# Patient Record
Sex: FEMALE | Race: WHITE | Employment: OTHER | URBAN - METROPOLITAN AREA
[De-identification: names, ages, dates, MRNs, and addresses within clinical notes are randomized per-mention and may not be internally consistent; named-entity substitution may affect disease eponyms.]

---

## 2017-04-05 ENCOUNTER — APPOINTMENT (OUTPATIENT)
Dept: CT IMAGING | Facility: CLINIC | Age: 67
End: 2017-04-05
Attending: EMERGENCY MEDICINE
Payer: MEDICARE

## 2017-04-05 ENCOUNTER — HOSPITAL ENCOUNTER (OUTPATIENT)
Facility: CLINIC | Age: 67
Setting detail: OBSERVATION
Discharge: NURSING, REHAB OR LONG-TERM CARE | End: 2017-04-07
Attending: EMERGENCY MEDICINE | Admitting: EMERGENCY MEDICINE
Payer: MEDICARE

## 2017-04-05 ENCOUNTER — APPOINTMENT (OUTPATIENT)
Dept: GENERAL RADIOLOGY | Facility: CLINIC | Age: 67
End: 2017-04-05
Attending: EMERGENCY MEDICINE
Payer: MEDICARE

## 2017-04-05 DIAGNOSIS — R29.6 FALLS FREQUENTLY: ICD-10-CM

## 2017-04-05 DIAGNOSIS — N39.0 URINARY TRACT INFECTION WITHOUT HEMATURIA, SITE UNSPECIFIED: Primary | ICD-10-CM

## 2017-04-05 DIAGNOSIS — G20.A1 PARKINSON DISEASE (H): ICD-10-CM

## 2017-04-05 DIAGNOSIS — E86.0 DEHYDRATION: ICD-10-CM

## 2017-04-05 LAB
ALBUMIN SERPL-MCNC: 4 G/DL (ref 3.4–5)
ALP SERPL-CCNC: 126 U/L (ref 40–150)
ALT SERPL W P-5'-P-CCNC: 8 U/L (ref 0–50)
ANION GAP SERPL CALCULATED.3IONS-SCNC: 6 MMOL/L (ref 3–14)
AST SERPL W P-5'-P-CCNC: 22 U/L (ref 0–45)
BASOPHILS # BLD AUTO: 0 10E9/L (ref 0–0.2)
BASOPHILS NFR BLD AUTO: 0.1 %
BILIRUB SERPL-MCNC: 0.6 MG/DL (ref 0.2–1.3)
BUN SERPL-MCNC: 33 MG/DL (ref 7–30)
CALCIUM SERPL-MCNC: 9.7 MG/DL (ref 8.5–10.1)
CHLORIDE SERPL-SCNC: 110 MMOL/L (ref 94–109)
CO2 SERPL-SCNC: 29 MMOL/L (ref 20–32)
CREAT SERPL-MCNC: 0.78 MG/DL (ref 0.52–1.04)
DIFFERENTIAL METHOD BLD: NORMAL
EOSINOPHIL # BLD AUTO: 0.2 10E9/L (ref 0–0.7)
EOSINOPHIL NFR BLD AUTO: 3.3 %
ERYTHROCYTE [DISTWIDTH] IN BLOOD BY AUTOMATED COUNT: 12.9 % (ref 10–15)
GFR SERPL CREATININE-BSD FRML MDRD: 74 ML/MIN/1.7M2
GLUCOSE SERPL-MCNC: 88 MG/DL (ref 70–99)
HCT VFR BLD AUTO: 43.1 % (ref 35–47)
HGB BLD-MCNC: 14.3 G/DL (ref 11.7–15.7)
IMM GRANULOCYTES # BLD: 0 10E9/L (ref 0–0.4)
IMM GRANULOCYTES NFR BLD: 0.1 %
LYMPHOCYTES # BLD AUTO: 1.6 10E9/L (ref 0.8–5.3)
LYMPHOCYTES NFR BLD AUTO: 22.3 %
MAGNESIUM SERPL-MCNC: 2.4 MG/DL (ref 1.6–2.3)
MCH RBC QN AUTO: 30.6 PG (ref 26.5–33)
MCHC RBC AUTO-ENTMCNC: 33.2 G/DL (ref 31.5–36.5)
MCV RBC AUTO: 92 FL (ref 78–100)
MONOCYTES # BLD AUTO: 0.4 10E9/L (ref 0–1.3)
MONOCYTES NFR BLD AUTO: 5.5 %
NEUTROPHILS # BLD AUTO: 5 10E9/L (ref 1.6–8.3)
NEUTROPHILS NFR BLD AUTO: 68.7 %
NRBC # BLD AUTO: 0 10*3/UL
NRBC BLD AUTO-RTO: 0 /100
PHOSPHATE SERPL-MCNC: 3.2 MG/DL (ref 2.5–4.5)
PLATELET # BLD AUTO: 217 10E9/L (ref 150–450)
POTASSIUM SERPL-SCNC: 4.2 MMOL/L (ref 3.4–5.3)
PROT SERPL-MCNC: 7.3 G/DL (ref 6.8–8.8)
RBC # BLD AUTO: 4.68 10E12/L (ref 3.8–5.2)
SODIUM SERPL-SCNC: 145 MMOL/L (ref 133–144)
TSH SERPL DL<=0.05 MIU/L-ACNC: 3.89 MU/L (ref 0.4–4)
WBC # BLD AUTO: 7.3 10E9/L (ref 4–11)

## 2017-04-05 PROCEDURE — 83735 ASSAY OF MAGNESIUM: CPT | Performed by: STUDENT IN AN ORGANIZED HEALTH CARE EDUCATION/TRAINING PROGRAM

## 2017-04-05 PROCEDURE — 71010 XR CHEST PORT 1 VW: CPT

## 2017-04-05 PROCEDURE — 84100 ASSAY OF PHOSPHORUS: CPT | Performed by: STUDENT IN AN ORGANIZED HEALTH CARE EDUCATION/TRAINING PROGRAM

## 2017-04-05 PROCEDURE — 99285 EMERGENCY DEPT VISIT HI MDM: CPT | Mod: 25

## 2017-04-05 PROCEDURE — A9270 NON-COVERED ITEM OR SERVICE: HCPCS | Mod: GY | Performed by: STUDENT IN AN ORGANIZED HEALTH CARE EDUCATION/TRAINING PROGRAM

## 2017-04-05 PROCEDURE — 99207 ZZC APP CREDIT; MD BILLING SHARED VISIT: CPT | Mod: Z6 | Performed by: EMERGENCY MEDICINE

## 2017-04-05 PROCEDURE — 84443 ASSAY THYROID STIM HORMONE: CPT | Performed by: STUDENT IN AN ORGANIZED HEALTH CARE EDUCATION/TRAINING PROGRAM

## 2017-04-05 PROCEDURE — 96361 HYDRATE IV INFUSION ADD-ON: CPT | Mod: 59

## 2017-04-05 PROCEDURE — 71250 CT THORAX DX C-: CPT

## 2017-04-05 PROCEDURE — 25000128 H RX IP 250 OP 636: Performed by: EMERGENCY MEDICINE

## 2017-04-05 PROCEDURE — 72170 X-RAY EXAM OF PELVIS: CPT

## 2017-04-05 PROCEDURE — 72125 CT NECK SPINE W/O DYE: CPT

## 2017-04-05 PROCEDURE — 93010 ELECTROCARDIOGRAM REPORT: CPT | Mod: Z6 | Performed by: EMERGENCY MEDICINE

## 2017-04-05 PROCEDURE — 80053 COMPREHEN METABOLIC PANEL: CPT | Performed by: STUDENT IN AN ORGANIZED HEALTH CARE EDUCATION/TRAINING PROGRAM

## 2017-04-05 PROCEDURE — 70450 CT HEAD/BRAIN W/O DYE: CPT

## 2017-04-05 PROCEDURE — 85025 COMPLETE CBC W/AUTO DIFF WBC: CPT | Performed by: STUDENT IN AN ORGANIZED HEALTH CARE EDUCATION/TRAINING PROGRAM

## 2017-04-05 PROCEDURE — 96360 HYDRATION IV INFUSION INIT: CPT

## 2017-04-05 PROCEDURE — 93005 ELECTROCARDIOGRAM TRACING: CPT

## 2017-04-05 PROCEDURE — 25000132 ZZH RX MED GY IP 250 OP 250 PS 637: Mod: GY | Performed by: STUDENT IN AN ORGANIZED HEALTH CARE EDUCATION/TRAINING PROGRAM

## 2017-04-05 PROCEDURE — 99219 ZZC INITIAL OBSERVATION CARE,LEVL II: CPT | Mod: Z6 | Performed by: NURSE PRACTITIONER

## 2017-04-05 RX ORDER — CARBIDOPA AND LEVODOPA 25; 100 MG/1; MG/1
2 TABLET ORAL
COMMUNITY

## 2017-04-05 RX ORDER — CARBIDOPA AND LEVODOPA 25; 100 MG/1; MG/1
1 TABLET ORAL
Status: DISCONTINUED | OUTPATIENT
Start: 2017-04-05 | End: 2017-04-05

## 2017-04-05 RX ORDER — ROPINIROLE 2 MG/1
4 TABLET, FILM COATED ORAL 3 TIMES DAILY
Status: DISCONTINUED | OUTPATIENT
Start: 2017-04-05 | End: 2017-04-06 | Stop reason: DRUGHIGH

## 2017-04-05 RX ORDER — SODIUM CHLORIDE 9 MG/ML
1000 INJECTION, SOLUTION INTRAVENOUS CONTINUOUS
Status: DISCONTINUED | OUTPATIENT
Start: 2017-04-05 | End: 2017-04-06 | Stop reason: DRUGHIGH

## 2017-04-05 RX ORDER — CHLORAL HYDRATE 500 MG
1 CAPSULE ORAL DAILY
COMMUNITY

## 2017-04-05 RX ORDER — CARBIDOPA AND LEVODOPA 25; 100 MG/1; MG/1
2 TABLET ORAL
Status: DISCONTINUED | OUTPATIENT
Start: 2017-04-05 | End: 2017-04-07 | Stop reason: HOSPADM

## 2017-04-05 RX ORDER — IBUPROFEN 200 MG
400 TABLET ORAL 2 TIMES DAILY PRN
Status: DISCONTINUED | OUTPATIENT
Start: 2017-04-05 | End: 2017-04-06

## 2017-04-05 RX ORDER — ROPINIROLE 12 MG/1
1 TABLET, FILM COATED, EXTENDED RELEASE ORAL EVERY EVENING
COMMUNITY

## 2017-04-05 RX ORDER — LORAZEPAM 2 MG/ML
0.5 INJECTION INTRAMUSCULAR
Status: DISCONTINUED | OUTPATIENT
Start: 2017-04-05 | End: 2017-04-06

## 2017-04-05 RX ADMIN — SODIUM CHLORIDE 1000 ML: 9 INJECTION, SOLUTION INTRAVENOUS at 18:19

## 2017-04-05 RX ADMIN — SODIUM CHLORIDE 1000 ML: 9 INJECTION, SOLUTION INTRAVENOUS at 17:18

## 2017-04-05 RX ADMIN — CARBIDOPA AND LEVODOPA 2 TABLET: 25; 100 TABLET ORAL at 16:22

## 2017-04-05 RX ADMIN — ROPINIROLE HYDROCHLORIDE 4 MG: 2 TABLET, FILM COATED ORAL at 20:24

## 2017-04-05 RX ADMIN — IBUPROFEN 400 MG: 200 TABLET, FILM COATED ORAL at 20:30

## 2017-04-05 RX ADMIN — CARBIDOPA AND LEVODOPA 2 TABLET: 25; 100 TABLET ORAL at 21:05

## 2017-04-05 ASSESSMENT — ACTIVITIES OF DAILY LIVING (ADL)
TOILETING: 1-->ASSISTIVE EQUIPMENT
WHICH_OF_THE_ABOVE_FUNCTIONAL_RISKS_HAD_A_RECENT_ONSET_OR_CHANGE?: FALL HISTORY
DRESS: 2-->ASSISTIVE PERSON
AMBULATION: 1-->ASSISTIVE EQUIPMENT
FALL_HISTORY_WITHIN_LAST_SIX_MONTHS: YES
RETIRED_COMMUNICATION: 0-->UNDERSTANDS/COMMUNICATES WITHOUT DIFFICULTY
TRANSFERRING: 1-->ASSISTIVE EQUIPMENT
RETIRED_EATING: 0-->INDEPENDENT
COGNITION: 1 - ATTENTION OR MEMORY DEFICITS
BATHING: 3-->ASSISTIVE EQUIPMENT AND PERSON
SWALLOWING: 0-->SWALLOWS FOODS/LIQUIDS WITHOUT DIFFICULTY

## 2017-04-05 ASSESSMENT — ENCOUNTER SYMPTOMS
TREMORS: 1
COUGH: 0
DIZZINESS: 0
DIAPHORESIS: 0
NECK STIFFNESS: 0
SORE THROAT: 0
HEADACHES: 0
CHILLS: 0
FATIGUE: 1
APNEA: 0
FACIAL SWELLING: 0
CONSTIPATION: 0
COLOR CHANGE: 0
ABDOMINAL PAIN: 0
CONFUSION: 0
CHEST TIGHTNESS: 0
ARTHRALGIAS: 0
SEIZURES: 0
WEAKNESS: 1
SHORTNESS OF BREATH: 0
DIFFICULTY URINATING: 0
NUMBNESS: 0
FEVER: 0
NAUSEA: 0
DIARRHEA: 0
VOMITING: 0

## 2017-04-05 NOTE — Clinical Note
Admitting Physician: KWAN KEN [[id number]]   Bed Type: Adult Med/Surg [46]   Special needs: Fall Risk [8]   Bed request comments: [place]

## 2017-04-05 NOTE — ED NOTES
Bed: ED02  Expected date:   Expected time:   Means of arrival: Ambulance  Comments:  H421    66 F Falling on LOC

## 2017-04-05 NOTE — ED NOTES
Presents via ambulance for multiple recent falls.  She has had at least 3 falls today.  Ambulance reports patient lives at home with family and is being cared for by  and grand-daughter.  Patient has parkinson's, and has been in long-term care facilities in the past but has been at home for over a year under the care of family.  Her  called ambulance because he felt family is no longer able to care for patient at home.  Ambulance reports  did not provide much more information other than this.  Patient's daughter is her POA, and ambulance unable to contact her.  Unsure if POA will be coming.

## 2017-04-05 NOTE — LETTER
Transition Communication Hand-off for Care Transitions to Next Level of Care Provider    Name: Brenda Clancy  MRN #: [id number]  Primary Care Provider: Joseph Alcantara MD     Primary Clinic: [facility] [address] [facility] [address]  [facility] [address]     Reason for Hospitalization:  Dehydration [E86.0]  Parkinson disease (H) [G20]  Falls frequently [R29.6]  Admit Date/Time: 4/5/2017  2:09 PM  Discharge Date: 4/7/2017  Payor Source: Payor: MEDICARE / Plan: MEDICARE / Product Type: Medicare /     Readmission Assessment Measure (MEL) Risk Score/category: Not available as pt wad observation status    Plan of Care Goals/Milestone Events:   Patient/Family Concerns: Ability of  to continue to care for pt in their home-caregiver burnout   Patient Goals:   Short-term Pt will d/c to [facility], as private pay, for TCU placement   Long-term Pt's dtr arriving into town tomorrow (4/8/2017) to discuss LTC plan of home w/ additional services vs LTC in a facility   Medical Goals   Short-term Finish abx for UTI           Reason for Communication Hand-off Referral: No support or lacking a support system   Pt's  expressed caregiver burnout and essentially expressed desire to walk out of the hospital and not be involved in d/c planning.     Discharge Plan:  D/C to [facility] TCU     Concern for non-adherence with plan of care: N    Follow-up plan:  No future appointments.            Key Recommendations:  If ultimate decision is to take pt home from [facility] will need increased services to assist  in caring for pt at home.     Susanne Grace    AVS/Discharge Summary is the source of truth; this is a helpful guide for improved communication of patient story

## 2017-04-05 NOTE — IP AVS SNAPSHOT
MRN:[id number]                      After Visit Summary   4/5/2017    Brenda Clancy    MRN: [id number]           Thank you!     Thank you for choosing [facility] for your care. Our goal is always to provide you with excellent care. Hearing back from our patients is one way we can continue to improve our services. Please take a few minutes to complete the written survey that you may receive in the mail after you visit with us. Thank you!        Patient Information     Date Of Birth          1950        Designated Caregiver       Most Recent Value    Caregiver    Will someone help with your care after discharge? yes    Name of designated caregiver Ramón-    Phone number of caregiver unknown    Caregiver address unknown      About your hospital stay     You were admitted on:  April 6, 2017 You last received care in the:  Unit 6D Observation [facility]    You were discharged on:  April 7, 2017        Reason for your hospital stay       Mrs. Swartz, you have been evaluated for generalized weakness; Imbalance problems with multiple falls.  You were evaluated by physical therapy who recommended TCU placement.  Ciprofloxacin for 3 more doses: Dx: Urinary Tract Infection  Follow up with TCU primary provider in 1-3 days.                  Who to Call     For medical emergencies, please call 911.  For non-urgent questions about your medical care, please call your primary care provider or clinic, [phone]          Attending Provider     Provider Specialty    Gage Loza MD Emergency Medicine    Paras Gold MD Emergency Medicine    Venkat Vazquez MD Emergency Medicine       Primary Care Provider Office Phone # Fax #    Joseph Alcantara [phone]       [facility] [place] [address]  [place] [address]        After Care Instructions     Activity - Up with nursing assistance           Advance Diet as Tolerated        "Follow this diet upon discharge: Orders Placed This Encounter      Regular Diet   Provide snacks between meals            Encourage PO fluids           Fall precautions           General info for SNF       Length of Stay Estimate: Short Term Care: Estimated # of Days <30  Condition at Discharge: Stable  Level of care:skilled   Rehabilitation Potential: Fair  Admission H&P remains valid and up-to-date: Yes  Recent Chemotherapy: N/A  Use Nursing Home Standing Orders: Yes            Intake and output       Every shift            Mantoux instructions       Give two-step Mantoux (PPD) Per Facility Policy Yes                  Additional Services     Occupational Therapy Adult Consult       Evaluate and treat as clinically indicated.    Reason:  Physical Deconditioning            Physical Therapy Adult Consult       Evaluate and treat as clinically indicated.    Reason:  Physical Deconditioning                  Pending Results     No orders found from 4/3/2017 to 4/6/2017.            Statement of Approval     Ordered          04/07/17 1748  I have reviewed and agree with all the recommendations and orders detailed in this document.  EFFECTIVE NOW     Approved and electronically signed by:  Marcos Middleton APRN CNP             Admission Information     Date & Time Provider Department Dept. Phone    4/5/2017 Venkat Vazquez MD Unit 6D Observation [facility] [phone]      Your Vitals Were     Blood Pressure Pulse Temperature Respirations Weight Last Period    122/79 (BP Location: Left arm) 63 97.9  F (36.6  C) (Oral) 16 53.1 kg (117 lb) 07/08/2003    Pulse Oximetry BMI (Body Mass Index)                96% 22.11 kg/m2          MyChart Information     [employer] lets you send messages to your doctor, view your test results, renew your prescriptions, schedule appointments and more. To sign up, go to [email or web address] . Click on \"Log in\" on the left side of the screen, which will take you to the " "Welcome page. Then click on \"Sign up Now\" on the right side of the page.     You will be asked to enter the access code listed below, as well as some personal information. Please follow the directions to create your username and password.     Your access code is: [email or web address]  Expires: 2017  5:49 PM     Your access code will  in 90 days. If you need help or a new code, please call your [facility] clinic or [phone].        Care EveryWhere ID     This is your Care EveryWhere ID. This could be used by other organizations to access your [facility] medical records  [id number]           Review of your medicines      START taking        Dose / Directions    ciprofloxacin 250 MG tablet   Commonly known as:  CIPRO   Indication:  Urinary Tract Infection   Used for:  Urinary tract infection without hematuria, site unspecified        Dose:  250 mg   Take 1 tablet (250 mg) by mouth every 12 hours for 3 doses   Refills:  0         CONTINUE these medicines which have NOT CHANGED        Dose / Directions    calcium carb 1250 mg (500 mg Unga)/vitamin D 200 units 500-200 MG-UNIT per tablet   Commonly known as:  OSCAL with D   Used for:  Paralysis agitans (H)        Dose:  1 tablet   Take 1 tablet by mouth 2 times daily (with meals)   Quantity:  90 tablet   Refills:  0       carbidopa-levodopa  MG per tablet   Commonly known as:  SINEMET        Dose:  2 tablet   Take 2 tablets by mouth 5 times daily   Refills:  0       fish oil-omega-3 fatty acids 1000 MG capsule        Dose:  1 g   Take 1 g by mouth daily   Refills:  0       naproxen sodium 220 MG capsule   Commonly known as:  ALEVE   Used for:  Paralysis agitans (H)        Dose:  220 mg   Take 220 mg by mouth 2 times daily as needed   Quantity:  60 capsule   Refills:  0       REQUIP XL 12 MG Tb24   Generic drug:  Ropinirole HCl        Dose:  1 tablet   Take 1 tablet by mouth every evening   Refills:  0            Where to get your medicines      Some of these will " need a paper prescription and others can be bought over the counter. Ask your nurse if you have questions.     You don't need a prescription for these medications     ciprofloxacin 250 MG tablet                Protect others around you: Learn how to safely use, store and throw away your medicines at www.disposemymeds.org.             Medication List: This is a list of all your medications and when to take them. Check marks below indicate your daily home schedule. Keep this list as a reference.      Medications           Morning Afternoon Evening Bedtime As Needed    calcium carb 1250 mg (500 mg [place])/vitamin D 200 units 500-200 MG-UNIT per tablet   Commonly known as:  OSCAL with D   Take 1 tablet by mouth 2 times daily (with meals)                                carbidopa-levodopa  MG per tablet   Commonly known as:  SINEMET   Take 2 tablets by mouth 5 times daily   Last time this was given:  2 tablets on 4/7/2017  1:14 PM                                ciprofloxacin 250 MG tablet   Commonly known as:  CIPRO   Take 1 tablet (250 mg) by mouth every 12 hours for 3 doses   Last time this was given:  250 mg on 4/7/2017  8:56 AM                                fish oil-omega-3 fatty acids 1000 MG capsule   Take 1 g by mouth daily   Last time this was given:  1 g on 4/6/2017  8:14 AM                                naproxen sodium 220 MG capsule   Commonly known as:  ALEVE   Take 220 mg by mouth 2 times daily as needed                                REQUIP XL 12 MG Tb24   Take 1 tablet by mouth every evening   Generic drug:  Ropinirole HCl                                          More Information        Fall Due to Dizziness, Weakness, or Loss of Balance  The symptoms that led to your fall have been evaluated. Your doctor feels it is safe for you to return home.  Many things can cause you to become dizzy. Everyone means a little something different by the word dizzy. People may describe their symptoms using these  words:    It doesn t feel right in my head    It feels like spinning in my head    It seems like the room is spinning    My balance feels off    I feel lightheaded, like I am going to pass out  All these descriptions can have real causes.     Your balance mechanism is in your inner ear. Anything disturbing it can make you feel dizzy, whether it is from a cold, an injury, or many other things. Anything that causes your blood pressure to drop suddenly can make you feel lightheaded, or like you are going to faint. This is because at that moment there might not be enough blood flowing to your brain. Causes include:    Medicines    Dehydration    Standing up or bending over too quickly    Becoming overheated    Taking a hot shower or bath    Straining hard while lifting something or using the toilet    Strokes, heart attack, heart valve disease, very slow or very fast heart rate    Low blood sugar    Ear infection    Hyperventilation    Anemia    Trauma    Infection    Panic attack    Pregnancy  You may be at risk of repeat falls. Take precautions described below to prevent another fall.  Home care    If you become lightheaded or dizzy, lie down immediately or sit and lean forward with your head down. It is better to do this than fall and seriously hurt or injure yourself.    Rest today. When changing position, take a moment to be sure any dizziness goes away before standing and walking.    If you have been prescribed a walker, be sure to use it whenever you walk, even if it is a short distance.    If you were injured during the fall, follow the advice from your doctor regarding care of your injury.    Be sure your doctor knows all the medicines, herbs, and supplements you take. Some medicines can cause dizziness.  Follow-up care  Unless given other advice, call your doctor on the next office day to advise of your fall and to schedule an appointment. You may require further treatment for the underlying condition that  caused today s fall.  If X-ray or a CT scan were done, you will be notified of the results, especially if it affects treatment.  Call 911  Call 911 if any of these occur:    Trouble breathing    Confused or difficulty arousing    Fainting or loss of consciousness    Rapid or very slow heart rate    Seizure    Difficulty with speech or vision, weakness of an arm or leg    Difficulty walking or talking, loss of balance    Numbness or weakness in one side of your body, facial droop  When to seek medical advice  Call your healthcare provider right away if any of the following occur:    Another fall    Continued dizzy spells    Severe headache    Blood in vomit or stools (black or red color)    9093-1184 [employer]. [address] [place] [address], [place] [address]. All rights reserved. This information is not intended as a substitute for professional medical care. Always follow your healthcare professional's instructions.

## 2017-04-05 NOTE — IP AVS SNAPSHOT
Brenda Rose #[id number] (CSN: [id number])  (66 year old F)  (Adm: 17)     [id number]               UNIT 6D OBSERVATION [facility]: [phone]            Patient Demographics     Patient Name Sex          Age SSN Address Phone    Brenda Rose Female 1950 (66 year old) [id number]  [facility] [address] [phone] (Home)  NONE (Mobile)      Emergency Contact(s)     Name Relation Home Work Mobile    BENITA VILLAREAL Spouse [phone]    LORE PRUITT Sister [phone]    FORREST ROSE Daughter [phone]    BENJIE PRUITT Daughter [phone]      Admission Information     Attending Provider Admitting Provider Admission Type Admission Date/Time    Venkat Vazquez MD Thompson, James, MD Emergency 17  1409    Discharge Date Hospital Service Auth/Cert Status Service Area     Emergency Medicine [facility]    Unit Room/Bed Admission Status       UU U6D OBSERVATION 6513/6513-02 Admission (Confirmed)       Admission     Complaint    Fall      Hospital Account     Name Acct ID Class Status Primary Coverage    Brenda Rose [id number] Observation Open MEDICARE - MEDICARE            Guarantor Account (for Hospital Account #[id number])     Name Relation to Pt Service Area Active? Acct Type    Brenda Rose  FCS Yes Personal/Family    Address Phone          [address] [place] [address] [phone](H)              Coverage Information (for Hospital Account #[id number])     1. MEDICARE/MEDICARE     F/O Payor/Plan Precert #    MEDICARE/MEDICARE     Subscriber Subscriber #    Brenda Rose [id number]    Address Phone    ATTN CLAIMS  [address]  [facility] [place] [address] [phone]          2. SELECTCARE/UMR SELECTCARE     F/O Payor/Plan Precert #    SELECTCARE/UMR SELECTCARE      "Subscriber Subscriber #    Brenda Rose [id number]    Address Phone    [address] [address]  [place] [address]                                                       INTERAGENCY TRANSFER FORM - PHYSICIAN ORDERS   4/5/2017                       UNIT 6D OBSERVATION [facility]: [phone]            Attending Provider: Venkat Vazquez MD     Allergies:  No Known Drug Allergies    Infection:  None   Service:  EMERGENCY ME    Ht:  1.549 m (5' 1\")   Wt:  53.1 kg (117 lb)   Admission Wt:  53.1 kg (117 lb)    BMI:  22.11 kg/m 2   BSA:  1.51 m 2            ED Clinical Impression     Diagnosis Description Comment Added By Time Added    Dehydration [E86.0] Dehydration [E86.0]  Gage Loza MD 4/5/2017  6:26 PM    Falls frequently [R29.6] Falls frequently [R29.6]  Gage Loza MD 4/5/2017  6:26 PM    Parkinson disease (H) [G20] Parkinson disease (H) [G20]  Gage Loza MD 4/5/2017  6:26 PM      Hospital Problems as of 4/7/2017              Priority Class Noted POA    Fall Medium  4/6/2017 Yes      Non-Hospital Problems as of 4/7/2017              Priority Class Noted    Hypothyroidism   4/8/2004    Symptomatic menopausal or female climacteric states   4/8/2004    Senile osteoporosis   5/4/2011    Health Care Home High  9/13/2011    Chronic low back pain Medium  9/17/2013    Spinal stenosis Medium  9/17/2013    SI (sacroiliac) joint dysfunction Medium  9/17/2013    At risk for falls Medium  9/17/2013    Physical deconditioning Medium  9/17/2013    Parkinson's disease (H) Medium  9/17/2013    CARDIOVASCULAR SCREENING; LDL GOAL LESS THAN 160   3/25/2014      Code Status History     Date Active Date Inactive Code Status Order ID Comments User Context    4/7/2017  5:47 PM  Full Code [id number]  Marcos Middleton APRN CNP Outpatient    4/6/2017 12:51 AM 4/7/2017  5:47 PM Full Code [id number]  Darlene Walker APRN CNP Inpatient    11/10/2013  6:37 PM " 11/15/2013  6:10 PM Full Code [id number]  Amber Suarez RN Inpatient    11/10/2013 12:13 PM 11/10/2013  6:37 PM DNR/DNI [id number]  Kelly Bradford PA-C Outpatient    11/10/2013 10:00 AM 11/10/2013 12:13 PM DNR/DNI [id number]  Kelly Bradford PA-C Inpatient    10/31/2013 10:50 AM 11/10/2013 10:00 AM Full Code [id number]  Héctor Conde MD Outpatient    10/27/2013  7:57 PM 10/31/2013 10:50 AM Full Code [id number]  Dustin Leigh MD Inpatient    9/2/2013 11:54 AM 9/24/2013  4:50 PM DNR/DNI [id number]  Roderick Linares MD Inpatient    8/28/2013  9:43 PM 9/2/2013 11:54 AM Full Code [id number]  Maikol Dunn MD Inpatient    8/28/2013  7:20 PM 8/28/2013  9:43 PM DNR/DNI [id number]  Bhakti Alvarez RN Inpatient      Current Code Status     Date Active Code Status Order ID Comments User Context       Prior      Summary of Visit     Reason for your hospital stay       Mrs. Swartz, you have been evaluated for generalized weakness; Imbalance problems with multiple falls.  You were evaluated by physical therapy who recommended TCU placement.  Ciprofloxacin for 3 more doses: Dx: Urinary Tract Infection  Follow up with TCU primary provider in 1-3 days.                Medication Review      START taking        Dose / Directions Comments    ciprofloxacin 250 MG tablet   Commonly known as:  CIPRO   Indication:  Urinary Tract Infection   Used for:  Urinary tract infection without hematuria, site unspecified        Dose:  250 mg   Take 1 tablet (250 mg) by mouth every 12 hours for 3 doses   Refills:  0          CONTINUE these medications which have NOT CHANGED        Dose / Directions Comments    calcium carb 1250 mg (500 mg [place])/vitamin D 200 units 500-200 MG-UNIT per tablet   Commonly known as:  OSCAL with D   Used for:  Paralysis agitans (H)        Dose:  1 tablet   Take 1 tablet by mouth 2 times daily (with meals)   Quantity:  90 tablet   Refills:  0        carbidopa-levodopa  MG per tablet    Commonly known as:  SINEMET        Dose:  2 tablet   Take 2 tablets by mouth 5 times daily   Refills:  0        fish oil-omega-3 fatty acids 1000 MG capsule        Dose:  1 g   Take 1 g by mouth daily   Refills:  0        naproxen sodium 220 MG capsule   Commonly known as:  ALEVE   Used for:  Paralysis agitans (H)        Dose:  220 mg   Take 220 mg by mouth 2 times daily as needed   Quantity:  60 capsule   Refills:  0        REQUIP XL 12 MG Tb24   Generic drug:  Ropinirole HCl        Dose:  1 tablet   Take 1 tablet by mouth every evening   Refills:  0                After Care     Activity - Up with nursing assistance           Advance Diet as Tolerated       Follow this diet upon discharge: Orders Placed This Encounter      Regular Diet   Provide snacks between meals       Encourage PO fluids           Fall precautions           General info for SNF       Length of Stay Estimate: Short Term Care: Estimated # of Days <30  Condition at Discharge: Stable  Level of care:skilled   Rehabilitation Potential: Fair  Admission H&P remains valid and up-to-date: Yes  Recent Chemotherapy: N/A  Use Nursing Home Standing Orders: Yes       Intake and output       Every shift       Mantoux instructions       Give two-step Mantoux (PPD) Per Facility Policy Yes             Referrals     Occupational Therapy Adult Consult       Evaluate and treat as clinically indicated.    Reason:  Physical Deconditioning       Physical Therapy Adult Consult       Evaluate and treat as clinically indicated.    Reason:  Physical Deconditioning             Statement of Approval     Ordered          04/07/17 1748  I have reviewed and agree with all the recommendations and orders detailed in this document.  EFFECTIVE NOW     Approved and electronically signed by:  Marcos Middleton APRN CNP                                                 INTERAGENCY TRANSFER FORM - NURSING   4/5/2017                       UNIT 6D OBSERVATION [facility]:  "[phone]            Attending Provider: Venkat Vazquez MD     Allergies:  No Known Drug Allergies    Infection:  None   Service:  EMERGENCY ME    Ht:  1.549 m (5' 1\")   Wt:  53.1 kg (117 lb)   Admission Wt:  53.1 kg (117 lb)    BMI:  22.11 kg/m 2   BSA:  1.51 m 2            Advance Directives        Does patient have a scanned Advance Directive/ACP document in EPIC?           No        Immunizations     Name Date      Mantoux 11/01/13     TDAP Vaccine (Adacel) 03/25/14       ASSESSMENT     Discharge Profile Flowsheet     EXPECTED DISCHARGE     SKIN      Expected Discharge Date  04/07/17 04/07/17 0901   Inspection  Full 04/07/17 1554    DISCHARGE NEEDS ASSESSMENT     Skin areas NOT inspected  Spine;Hip, left;Hip, right;Buttock, right;Buttock, left;Sacrum;Coccyx 04/07/17 0935    Equipment Currently Used at Home  walker, rolling;wheelchair 04/06/17 1424   Skin WDL  ex 04/07/17 1554    Transportation Available  car;family or friend will provide 04/06/17 1424   Skin Temperature  warm 04/07/17 0935    Equipment Used at Home  bath bench;grab bar;walker, rolling;cane, straight 11/11/13 1447   Skin Moisture  dry 04/07/17 1554    GASTROINTESTINAL (ADULT,PEDIATRIC,OB)     Skin Elasticity  quick return to original state 04/07/17 0935    GI WDL  WDL 04/07/17 1554   Skin Integrity  rash(es) 04/07/17 0935    Last Bowel Movement  04/04/17 04/07/17 0935   SAFETY      COMMUNICATION ASSESSMENT     Safety WDL  WDL 04/07/17 0935    Patient's communication style  spoken language (English or Bilingual) 04/05/17 1409                      Assessment WDL (Within Defined Limits) Definitions           Safety WDL     Effective: 09/28/15    Row Information: <b>WDL Definition:</b> Bed in low position, wheels locked; call light in reach; upper side rails up x 2; ID band on<br> <font color=\"gray\"><i>Item=AS safety wdl>>List=AS safety wdl>>Version=F14</i></font>      Skin WDL     Effective: 09/28/15    Row Information: <b>WDL " "Definition:</b> Warm; dry; intact; elastic; without discoloration; pressure points without redness<br> <font color=\"gray\"><i>Item=AS skin wdl>>List=AS skin wdl>>Version=F14</i></font>      Vitals     Vital Signs Flowsheet     VITAL SIGNS     Comfort  negligible pain 04/06/17 1029    Temp  97.9  F (36.6  C) 04/07/17 1519   HEIGHT AND WEIGHT      Temp src  Oral 04/07/17 1516   Height Method  Actual 04/05/17 1425    Resp  16 04/07/17 1516   Weight  53.1 kg (117 lb) 04/05/17 1425    Pulse  63 04/07/17 1519   POSITIONING      Pulse/Heart Rate Source  Monitor 04/07/17 1516   Body Position  supine, head elevated;independently positioning;log-rolled 04/07/17 0935    BP  122/79 04/07/17 1516   Head of Bed (HOB)  HOB at 30 degrees 04/07/17 0935    BP Location  Left arm 04/07/17 1519   DAILY CARE      OXYGEN THERAPY     Activity Type  activity adjusted per tolerance;ROM, active encouraged 04/07/17 0935    SpO2  96 % 04/07/17 1518   Activity Level of Assistance  assistance, 2 people 04/07/17 0935    O2 Device  None (Room air) 04/07/17 1518   DANE COMA SCALE      PAIN/COMFORT     Best Eye Response  4-->(E4) spontaneous 04/06/17 2009    Patient Currently in Pain  denies 04/07/17 0929   Best Motor Response  6-->(M6) obeys commands 04/06/17 2009    Preferred Pain Scale  CAPA (Clinically Aligned Pain Assessment) ([facility] Adults Only) 04/07/17 0140   Best Verbal Response  5-->(V5) oriented 04/06/17 2009    CLINICALLY ALIGNED PAIN ASSESSMENT (CAPA) ([facility] ADULTS ONLY)     [place] Coma Scale Score  15 04/06/17 2009            Patient Lines/Drains/Airways Status    Active LINES/DRAINS/AIRWAYS     Name: Placement date: Placement time: Site: Days: Last dressing change:    Peripheral IV 04/06/17 Right Lower forearm 04/06/17      Lower forearm   1     Pressure Injury 04/05/17 Right Knee scab- old abrasion 04/05/17   1437    2     Pressure Injury 04/05/17 Right Buttocks non blancheable spot ~4inches " long 04/05/17   1439    2     Pressure Injury 04/05/17 Bilateral Heel pink  04/05/17   1441    2     Wound 09/02/13 Coccyx Suspected pressure ulcer 09/02/13   1435   Coccyx   1313     Wound 09/02/13 Bilateral Heel blanchable erythema on johnny heels 09/02/13   1436   Heel   1313     Wound 10/27/13 Left Buttocks Other (comment)  10/27/13   1937   Buttocks   1257     Wound 10/28/13 Bilateral Knee Abrasion(s) 10/28/13   0130   Knee   1257     Wound 04/05/17 Medial Umbilicus Red rash looking roundness around belly button- yeasty looking 04/05/17   1444   Umbilicus   2             Patient Lines/Drains/Airways Status    Active PICC/CVC     None            Intake/Output Detail Report     Date Intake     Output Net    Shift P.O. I.V. IV Piggyback Total Urine Total       Brenda 04/06/17 0700 - 04/06/17 1459 -- -- -- -- 550 550 -550    Noc 04/06/17 1500 - 04/06/17 2359 -- -- -- -- 500 500 -500    Day 04/07/17 0000 - 04/07/17 0659 350 -- -- 350 300 300 50    Brenda 04/07/17 0700 - 04/07/17 1459 650 370 -- 1020 -- -- 1020    Noc 04/07/17 1500 - 04/07/17 2359 -- -- -- -- -- -- 0      Last Void/BM       Most Recent Value    Urine Occurrence 1 at 04/07/2017 0900    Stool Occurrence 1 at 04/07/2017 1300      Case Management/Discharge Planning     Case Management/Discharge Planning Flowsheet     REFERRAL INFORMATION     FINAL RESOURCES      Arrived From  acute care facility 11/10/13 1724   Equipment Currently Used at Home  walker, rolling;wheelchair 04/06/17 1424    LIVING ENVIRONMENT     [facility] CAREGIVER      Lives With  spouse 04/06/17 1424   Filed Complexity Screen Score  5 04/07/17 0900    Living Arrangements  house 04/06/17 1424   ABUSE RISK SCREEN      COPING/STRESS     QUESTION TO PATIENT:  Has a member of your family or a partner(now or in the past) intimidated, hurt, manipulated, or controlled you in any way?  no 04/05/17 1418    Major Change/Loss/Stressor  none 04/05/17 2332   QUESTION TO PATIENT: Do you feel safe going back to the  place where you are living?  yes 04/05/17 1418    EXPECTED DISCHARGE     OBSERVATION: Is there reason to believe there has been maltreatment of a vulnerable adult (ie. Physical/Sexual/Emotional abuse, self neglect, lack of adequate food, shelter, medical care, or financial exploitation)?  no 04/05/17 1418    Expected Discharge Date  04/07/17 04/07/17 0901   (R) MENTAL HEALTH SUICIDE RISK      DISCHARGE PLANNING     Are you depressed or being treated for depression?  No 04/05/17 2331    Transportation Available  car;family or friend will provide 04/06/17 1424   HOMICIDE RISK      Equipment Used at Home  bath bench;grab bar;walker, rolling;cane, straight 11/11/13 1447   Homicidal Ideation  no 04/05/17 1418                  UNIT 6D OBSERVATION [facility]: [phone]            Medication Administration Report for Brenda Rose as of 04/07/17 1750   Legend:    Given Hold Not Given Due Canceled Entry Other Actions    Time Time (Time) Time  Time-Action       Inactive    Active    Linked        Medications 04/01/17 04/02/17 04/03/17 04/04/17 04/05/17 04/06/17 04/07/17    acetaminophen (TYLENOL) tablet 650 mg  Dose: 650 mg Freq: EVERY 4 HOURS PRN Route: PO  PRN Reason: mild pain  Start: 04/06/17 0050   Admin Instructions: Alternate ibuprofen (if ordered) with acetaminophen.  Maximum acetaminophen dose from all sources = 75 mg/kg/day not to exceed 4 grams/day.               calcium carb 1250 mg (500 mg [place])/vitamin D 200 units (OSCAL with D) per tablet 1 tablet  Dose: 1 tablet Freq: 2 TIMES DAILY WITH MEALS Route: PO  Start: 04/06/17 0800         0913-Hold [C]       1748-Hold [C]        (0856)-Not Given       [ ] 1800           carbidopa-levodopa (SINEMET)  MG per tablet 2 tablet  Dose: 2 tablet Freq: 5 TIMES DAILY Route: PO  Start: 04/05/17 2022        2105 (2 tablet)-Given        0111 (2 tablet)-Given       0814 (2 tablet)-Given       1301 (2 tablet)-Given       1646 (2 tablet)-Given       1951 (2  tablet)-Given       2351 (2 tablet)-Given        0855 (2 tablet)-Given       1314 (2 tablet)-Given       [ ] 1600       [ ] 2000           ciprofloxacin (CIPRO) tablet 250 mg  Dose: 250 mg Freq: EVERY 12 HOURS SCHEDULED Route: PO  Indications of Use: URINARY TRACT INFECTION  Start: 04/06/17 0800   Admin Instructions: Administer at least 2 hours before or 4 hours after aluminum, calcium, iron, zinc or magnesium containing medications. May be taken with food or on an empty stomach.  Do not administer alone with a dairy product like milk or yogurt or calcium-fortified juice,  but may be administered with a meal containing dairy.          0910 (250 mg)-Given       1951 (250 mg)-Given        0856 (250 mg)-Given       [ ] 2000           fish oil-omega-3 fatty acids capsule 1 g  Dose: 1 g Freq: DAILY Route: PO  Start: 04/06/17 0800         0814 (1 g)-Given        (0856)-Not Given           lactated ringers infusion  Rate: 75 mL/hr Freq: CONTINUOUS Route: IV  Start: 04/07/17 0930          1050 ( )-New Bag       1330 ( )-Rate/Dose Verify       1600 ( )-Rate/Dose Verify           naloxone (NARCAN) injection 0.1-0.4 mg  Dose: 0.1-0.4 mg Freq: EVERY 2 MIN PRN Route: IV  PRN Reason: opioid reversal  Start: 04/06/17 0050   Admin Instructions: For respiratory rate LESS than or EQUAL to 8.  Partial reversal dose:  0.1 mg titrated q 2 minutes for Analgesia Side Effects Monitoring Sedation Level of 3 (frequently drowsy, arousable, drifts to sleep during conversation).Full reversal dose:  0.4 mg bolus for Analgesia Side Effects Monitoring Sedation Level of 4 (somnolent, minimal or no response to stimulation).               naproxen sodium (ANAPROX) tablet 220 mg  Dose: 220 mg Freq: 2 TIMES DAILY PRN Route: PO  PRN Comment: pain in legs  Start: 04/06/17 0050              rOPINIRole (REQUIP) tablet 4 mg  Dose: 4 mg Freq: 3 TIMES DAILY Route: PO  Start: 04/07/17 0000          0011 (4 mg)-Given       0855 (4 mg)-Given       1413 (4  mg)-Given       [ ] 2000           sodium chloride (PF) 0.9% PF flush 3 mL  Dose: 3 mL Freq: EVERY 8 HOURS Route: IK  Start: 04/05/17 1517   Admin Instructions: And Q1H PRN, to lock peripheral IV dormant line.         1629 (3 mL)-Given        (0209)-Not Given       (0923)-Not Given       1533 (3 mL)-Given       (2220)-Not Given        (0623)-Not Given       (1646)-Not Given       [ ] 2317           sodium chloride (PF) 0.9% PF flush 3 mL  Dose: 3 mL Freq: EVERY 1 HOUR PRN Route: IK  PRN Reason: line flush  PRN Comment: for peripheral IV flush post IV meds  Start: 04/05/17 1511             Completed Medications  Medications 04/01/17 04/02/17 04/03/17 04/04/17 04/05/17 04/06/17 04/07/17         Dose: 500 mL Freq: ONCE Route: IV  Last Dose: Stopped (04/05/17 2135)  Start: 04/05/17 1701   End: 04/05/17 2135        1718 (1,000 mL)-New Bag       2135-Stopped            Discontinued Medications  Medications 04/01/17 04/02/17 04/03/17 04/04/17 04/05/17 04/06/17 04/07/17         Rate: 75 mL/hr Freq: CONTINUOUS Route: IV  Last Dose: Stopped (04/07/17 1030)  Start: 04/06/17 0115   End: 04/07/17 0914         0306 ( )-New Bag       0406 ( )-Rate/Dose Verify       0922 ( )-Rate/Dose Verify       1532 ( )-Rate/Dose Change       1533 ( )-New Bag       1947 ( )-Rate/Dose Verify       2059 ( )-Rate/Dose Verify       2215 ( )-Rate/Dose Verify       2329 ( )-Rate/Dose Verify        0029 ( )-Rate/Dose Verify       0133 ( )-Rate/Dose Verify       0236 ( )-Rate/Dose Verify       0337 ( )-Rate/Dose Verify       0439 ( )-Rate/Dose Verify       0521 ( )-New Bag       0623 ( )-Rate/Dose Verify       0724 ( )-Rate/Dose Verify       0855 ( )-Rate/Dose Verify       0914-Med Discontinued  1030-Stopped             Rate: 125 mL/hr Freq: CONTINUOUS Route: IV  Last Dose: Stopped (04/05/17 2135)  Start: 04/05/17 1701   End: 04/06/17 0112   Admin Instructions: Administer after the bolus/s         7221 (1,000 mL)-New Bag       2135-Stopped         0112-Med Discontinued          Dose: 2 tablet Freq: ONCE Route: PO  Start: 04/06/17 0100   End: 04/06/17 0056   Admin Instructions: This is the midnight dose a bit late, give now          0056-Med Discontinued          Dose: 1 tablet Freq: 5 TIMES DAILY Route: PO  Start: 04/05/17 1601   End: 04/05/17 2022        1622 (2 tablet)-Given       2022-Med Discontinued           Dose: 2 tablet Freq: ONCE Route: PO  Start: 04/06/17 0130   End: 04/06/17 0111   Admin Instructions: This is the missed midnight dose, give now          0111-Med Discontinued          Dose: 2 tablet Freq: 5 TIMES DAILY Route: PO  Start: 04/06/17 0800   End: 04/06/17 0113         0113-Med Discontinued          Dose: 400 mg Freq: 2 TIMES DAILY PRN Route: PO  PRN Reason: mild pain  Start: 04/05/17 2007   End: 04/06/17 0050        2030 (400 mg)-Given        0050-Med Discontinued          Dose: 0.5 mg Freq: ONCE PRN Route: IV  PRN Reason: other  PRN Comment: to decrease movement for CT  Start: 04/05/17 1630   End: 04/06/17 0050   Admin Instructions: For IV PUSH: Dilute with equal volume of NS.          0050-Med Discontinued          Dose: 12 mg Freq: DAILY Route: PO  Start: 04/06/17 0800   End: 04/05/17 1608        1608-Med Discontinued           Dose: 4 mg Freq: 3 TIMES DAILY Route: PO  Start: 04/05/17 2000   End: 04/06/17 0123        2024 (4 mg)-Given        0123-Med Discontinued          Dose: 1 tablet Freq: EVERY EVENING Route: PO  Start: 04/06/17 2000   End: 04/06/17 2142 2142-Med Discontinued  (2143)-Not Given              Dose: 12.5 mg Freq: EVERY EVENING Route: PO  Start: 04/06/17 2142   End: 04/06/17 2354         2354-Med Discontinued    (0010)-Not Given        Medications 04/01/17 04/02/17 04/03/17 04/04/17 04/05/17 04/06/17 04/07/17               INTERAGENCY TRANSFER FORM - NOTES (H&P, Discharge Summary, Consults, Procedures, Therapies)   4/5/2017                       UNIT 6D OBSERVATION [facility]: [phone]                History & Physicals      H&P by Darlene Walker APRN CNP at 4/5/2017  7:17 PM     Author:  Darlene Walker APRN CNP Service:  Emergency Medicine Author Type:  Nurse Practitioner    Filed:  4/6/2017  3:24 PM Date of Service:  4/5/2017  7:17 PM Note Created:  4/5/2017  7:17 PM    Status:  Attested :  Darlene Walker APRN CNP (Nurse Practitioner)    Cosigner:  Paras Gold MD at 4/7/2017  2:19 PM        Attestation signed by Paras Gold MD at 4/7/2017  2:19 PM        Physician Attestation   I, Paras Gold, have reviewed the advanced practice provider's history, physical and plan for Brenda Clancy. I did not participate in a shared visit by interviewing or examining the patient and this should be billed as an advanced practice provider only visit.    Paras Gold  Date of Service (when I saw the patient): I did not personally see this patient today.                                 Admission Date:[PF1.1] 04/05/17[PF1.2]  Attending Physician: Dr. Paras Gold  Primary Care Physician:[PF1.1] Joseph Alcantara[PF1.3]\  NP/PA: RAJ Avery CNP    REASON FOR ADMISSION:     Chief Complaint   Patient presents with     Fall     slid off the toilet today, family feels they are no longer able to care for patient at home     History of Present Illness, per ER MD: Brenda Clancy is a 66 year old female with Hx Parkinson's disease, hypothyroidism, physical deconditioning, BIBA p/w falling with imbalance. Pt states that she was resting on the toilet when she slid forward off the toilet and struck her forehead. Pt states that she didn't hurt herself, no LoC. No HA, change in vision, no dizziness/vertigo, no spells. Pt was too weak to lift herself back off the floor. She was on the floor for about 5 minutes before her  heard her and came to rescue her. He was too weak to lift her so EMS was called. EMS reports that the pt  "has fallen at least 3 times today, and pt endorses frequent daily falls. Pt ambulates at home with a walker.     Pt has lived in some sort of facility (LTF or OT Rehab) in the past, but has lived at home for the past 1+ year with family. Family would like for pt to be stabilized and then return home. Family is seeking additional home health care to assist pt w/ morning ADLs.     Pt denies HA, fever, chills, cough, difficulty breathing, palpitations or chest pain, nausea, vomiting, diarrhea, constipation, sudden weakness, MSK or joint pains, vertigo. No recent illnesses or recent medication changes.     ED Course:[PF1.1] See full lab detail, CBC normal, CMP WNL but suggestive of mild dehydration. Positive UA for UTI. Afebrile with VSS.   EKG: Junctional rhythm rate of 57, no ST changes, ? Old anterior infarct   CXR:         IMPRESSION: There is patchy opacification of the left lung base. The  right lung is clear. Cannot exclude a small left effusion. No  pneumothorax.     XR Pelvis: \"No evidence of acute fracture or malalignment\"  Chest CT: IMPRESSION: \"Unremarkable unenhanced CT of the chest. Specifically, the lungs are clear\". Please see full report for further details.   Head CT: IMPRESSION:   1. No acute abnormality.  2. Atrophy of the brain. White matter changes consistent with sequelae of small vessel ischemic disease. This is unchanged.  CT cervical spine: IMPRESSION:  1. Degenerative changes.  2. No evidence of acute trauma.  3. Motion artifacts limit detail for small fractures.[PF1.4]    Observation Unit Arrival:[PF1.1] 2238 arrived via gurney. Placed in bed, used bedpan. Had Sinemet in ER. Needs midnight dose.[PF1.4]     REVIEW OF SYSTEMS:[PF1.1]  Constitutional: Positive for fatigue. Negative for chills, diaphoresis and fever.   HENT: Negative for congestion, facial swelling, hearing loss and sore throat.   Eyes: Negative for visual disturbance.   Respiratory: Negative for apnea, cough, chest tightness and " shortness of breath.   Cardiovascular: Negative for chest pain.   Gastrointestinal: Negative for abdominal pain, constipation, diarrhea, nausea and vomiting.   Genitourinary: Negative for difficulty urinating.   Musculoskeletal: Negative for arthralgias and neck stiffness.   Skin: Negative for color change.   Neurological: Positive for tremors and weakness. Negative for dizziness, seizures, syncope, numbness and headaches.   Psychiatric/Behavioral: Negative for confusion.    Unchanged from ER and as below from ER note.[PF1.4]   PAST MEDICAL HISTORY:  Past Medical History:   Diagnosis Date     Bunion 4/11/00    RIGHT FIRST TOE     Cyst of Bartholin's gland     COLPOSCOPY PER PT -EARLY '80'S - WAS OKAY     Delirium 10/27/2013     HYPOTHYROIDISM      Paralysis agitans (H) 3/13/01    [facility] '[facility] DZ - DR CRAWFORD,NEUROLOGY     Symptomatic menopausal or female climacteric states 1/29/01       PAST SURGICAL HISTORY:  Past Surgical History:   Procedure Laterality Date     C NONSPECIFIC PROCEDURE      S/P EXCISED GANGLION,PALM LEFT HAND     C NONSPECIFIC PROCEDURE  4/13/00    [facility] BUNIONECTOMY RIGHT FIRST TOE     SOCIAL HISTORY:[PF1.1]  Social History   Substance Use Topics     Smoking status: Former Smoker     Quit date: 4/8/1970     Smokeless tobacco: Never Used     Alcohol use No[PF1.5]     FAMILY HISTORY:[PF1.1]  Family History   Problem Relation Age of Onset     CANCER Mother         UTERINE     DIABETES Mother         ADULT ONSET     Cancer - colorectal Father       DIABETES Father         ADULT ONSET     Breast Cancer[PF1.4]           ALLERGIES:   No Known Allergies[PF1.1]  Allergies   Allergen Reactions     No Known Drug Allergies[PF1.6]      MEDICATIONS:    No current facility-administered medications on file prior to encounter.   Current Outpatient Prescriptions on File Prior to Encounter:  naproxen sodium (ALEVE) 220 MG capsule Take 220 mg by mouth 2 times daily as needed   calcium carb 1250 mg, 500 mg  [place],/vitamin D 200 units (OSCAL WITH D) 500-200 MG-UNIT per tablet Take 1 tablet by mouth 2 times daily (with meals)   [DISCONTINUED] carbidopa-levodopa (SINEMET)  MG per tablet Take 1 tablet by mouth 6 times daily (Patient taking differently: Take 2 tablets by mouth 5 times daily )       PHYSICAL EXAM:   95/55, T: 97.4, P: 66, R: 16    All vital signs were reviewed.  GENERAL APPEARENCE: Pleasant, generally appears[PF1.1] de-conditioned[PF1.4], A[PF1.1]lert, oriented for events, place but tired and wanting to sleep[PF1.4]. NAD.  SKIN: Clean, dry, and intact without visible lesions, rash, jaundice, cyanosis, erythema[PF1.1].[PF1.4] ecchymoses[PF1.1] of left knee without swelling[PF1.4].  HEENT: NCAT w/out masses, lesions, or abnormalities. Sclera anicteric,   NECK: Supple, no masses. No jugular venous distention.[PF1.1] No pain[PF1.4]  CARDIOVASCULAR: S1, S2 RRR.[PF1.1] Faint[PF1.4] murmur,[PF1.1] no[PF1.4] rubs, or gallops.   RESPIRATORY: Respiratory effort WNL. CTA  bilaterally without crackles/rales/wheeze   GI:  BS[PF1.1] subtle[PF1.4] in all 4 quadrants. Abdomen soft and non-tender. No masses or hepatosplenomegaly.  : Deferred[PF1.1], UA shows UTI[PF1.4]  MUSCULOSKELETAL: Gait is[PF1.1] not observed[PF1.4]. Strength in major muscle groups of bilateral UE and LE[PF1.1] limited by Parkinson's and deconditioning[PF1.4].  Extremities non-tender to palpation.[PF1.1] Left knee bruised but not painful subjectively. Some mild tremor at end of Sinemet dosing interval noted.[PF1.4]   PV: 2+ bilateral radial and pedal pulses. No edema noted.   NEURO: Speech[PF1.1] halting[PF1.4]. Appropriate throughout interview.   Sensation grossly WNL. Finger to nose and rapid alternating movements[PF1.1] not possible due to Parkinsons[PF1.4].[PF1.1] Patient has rigidity and poor coordination of extremities.[PF1.4]   PSYCH:[PF1.1] Flat[PF1.4] affect[PF1.1] with halting speech[PF1.4]  HEME/LYMPH: No visible bleeding. No palpable  mandibular/cervical/supra/infraclavicular lymph nodes  PSYCHIATRIC: Mentation and affect appear normal  VASCULAR ACCESS: CDI without erythema or discharge. Non-tender.      DATA :  Results for orders placed or performed during the hospital encounter of 04/05/17 (from the past 24 hour(s))   EKG 12-lead, tracing only   Result Value Ref Range    Interpretation ECG Click View Image link to view waveform and result    Chest  XR, 1 view portable    Narrative    XR CHEST PORT 1 VW 4/5/2017 3:50 PM    HISTORY: Trauma.    COMPARISON: October 27, 2013.      Impression    IMPRESSION: There is patchy opacification of the left lung base. The  right lung is clear. Cannot exclude a small left effusion. No  pneumothorax.    ANGUS FERNÁNDEZ MD   XR Pelvis Port 1/2 Views    Narrative    XR PELVIS PORT 1/2 VW 4/5/2017 3:52 PM    HISTORY: Trauma.    COMPARISON: September 10, 2013.      Impression    IMPRESSION: No evidence of acute fracture or malalignment.    ANGUS FERNÁNDEZ MD   CBC with platelets differential   Result Value Ref Range    WBC 7.3 4.0 - 11.0 10e9/L    RBC Count 4.68 3.8 - 5.2 10e12/L    Hemoglobin 14.3 11.7 - 15.7 g/dL    Hematocrit 43.1 35.0 - 47.0 %    MCV 92 78 - 100 fl    MCH 30.6 26.5 - 33.0 pg    MCHC 33.2 31.5 - 36.5 g/dL    RDW 12.9 10.0 - 15.0 %    Platelet Count 217 150 - 450 10e9/L    Diff Method Automated Method     % Neutrophils 68.7 %    % Lymphocytes 22.3 %    % Monocytes 5.5 %    % Eosinophils 3.3 %    % Basophils 0.1 %    % Immature Granulocytes 0.1 %    Nucleated RBCs 0 0 /100    Absolute Neutrophil 5.0 1.6 - 8.3 10e9/L    Absolute Lymphocytes 1.6 0.8 - 5.3 10e9/L    Absolute Monocytes 0.4 0.0 - 1.3 10e9/L    Absolute Eosinophils 0.2 0.0 - 0.7 10e9/L    Absolute Basophils 0.0 0.0 - 0.2 10e9/L    Abs Immature Granulocytes 0.0 0 - 0.4 10e9/L    Absolute Nucleated RBC 0.0    Comprehensive metabolic panel   Result Value Ref Range    Sodium 145 (H) 133 - 144 mmol/L    Potassium 4.2 3.4 - 5.3 mmol/L    Chloride  110 (H) 94 - 109 mmol/L    Carbon Dioxide 29 20 - 32 mmol/L    Anion Gap 6 3 - 14 mmol/L    Glucose 88 70 - 99 mg/dL    Urea Nitrogen 33 (H) 7 - 30 mg/dL    Creatinine 0.78 0.52 - 1.04 mg/dL    GFR Estimate 74 >60 mL/min/1.7m2    GFR Estimate If Black 90 >60 mL/min/1.7m2    Calcium 9.7 8.5 - 10.1 mg/dL    Bilirubin Total 0.6 0.2 - 1.3 mg/dL    Albumin 4.0 3.4 - 5.0 g/dL    Protein Total 7.3 6.8 - 8.8 g/dL    Alkaline Phosphatase 126 40 - 150 U/L    ALT 8 0 - 50 U/L    AST 22 0 - 45 U/L   Magnesium   Result Value Ref Range    Magnesium 2.4 (H) 1.6 - 2.3 mg/dL   Phosphorus   Result Value Ref Range    Phosphorus 3.2 2.5 - 4.5 mg/dL   TSH   Result Value Ref Range    TSH 3.89 0.40 - 4.00 mU/L   Head CT w/o contrast    Narrative    CT SCAN OF THE HEAD WITHOUT CONTRAST   4/5/2017 6:02 PM     HISTORY: Trauma. Parkinsonism.     TECHNIQUE:  Axial images of the head and coronal reformations without  IV contrast material. Radiation dose for this scan was reduced using  automated exposure control, adjustment of the mA and/or kV according  to patient size, or iterative reconstruction technique.    COMPARISON: 10/27/2013.    FINDINGS:  There is generalized atrophy of the brain.  There is low  attenuation in the white matter of the cerebral hemispheres consistent  with sequelae of small vessel ischemic disease. There is no evidence  of intracranial hemorrhage, mass, acute infarct or anomaly.     The visualized portions of the sinuses and mastoids appear normal.  There is no evidence of trauma.      Impression    IMPRESSION:   1. No acute abnormality.  2. Atrophy of the brain.  White matter changes consistent with  sequelae of small vessel ischemic disease. This is unchanged.   Cervical spine CT w/o contrast    Narrative    CT CERVICAL SPINE WITHOUT CONTRAST   4/5/2017 6:02 PM     HISTORY: Trauma. Parkinson's disease.     TECHNIQUE: Axial images of the cervical spine were obtained without  intravenous contrast. Multiplanar  reformations were performed.  Radiation dose for this scan was reduced using automated exposure  control, adjustment of the mA and/or kV according to patient size, or  iterative reconstruction technique.    COMPARISON: None.    FINDINGS: There is no evidence of fracture. Motion artifacts limit  detail, however. There is multilevel degenerative disc disease  especially at C5-C6 and C6-C7. There is multilevel degenerative facet  arthropathy bilaterally. There is grade 1 subluxation of C4 on C5.  There is no paraspinous soft tissue swelling. Spinal canal is widely  patent.      Impression    IMPRESSION:  1. Degenerative changes.  2. No evidence of acute trauma.  3. Motion artifacts limit detail for small fractures.   Chest CT w/o contrast    Narrative    CT CHEST WITHOUT CONTRAST  4/5/2017 6:03 PM    HISTORY: Left-sided opacity on a recent radiograph.     COMPARISON: A chest radiograph earlier today.    TECHNIQUE: Routine transverse CT imaging of the chest was performed  without contrast. Radiation dose for this scan was reduced using  automated exposure control, adjustment of the mA and/or kV according  to patient size, or iterative reconstruction technique.    FINDINGS: The heart size is normal. No enlarged lymph node or other  abnormal mediastinal mass is seen. The thoracic aorta and pulmonary  arteries are unremarkable, allowing for the absence of contrast. The  lungs are clear. No pneumothorax is demonstrated. No pleural effusion  is identified. There appear to be old left posterior rib fractures. No  acute fracture or other osseous abnormality is seen. No chest wall  pathology is seen. The visualized upper abdomen is unremarkable.      Impression    IMPRESSION: Unremarkable unenhanced CT of the chest. Specifically, the  lungs are clear.    JEREMY HUIZAR MD       ASSESSMENT/PLAN:[PF1.1] Brenda Clancy is a 66 year old female with Hx Parkinson's disease, hypothyroidism, physical deconditioning, BIBA  p/w falling with imbalance. Fall from toilet and struck her head tonight but without injury or positive LOC. She was too weak to[PF1.7] get herself up.[PF1.8]Patient is cared for by her elderly . Family needing assistance with discharge options.[PF1.4]      #. Falls:[PF1.1] History of Parkinson's. Unsteady gait and uses a walker with assist. Does not ambulate on her own. Also has a w/c. Patient is generally deconditioned.[PF1.9] Fell two or three times today. Possibly having more unreported falls? No injury, negative trauma workup in the ER including negative head CT.   - SW and discharge planning to address patients safe discharge[PF1.4]    #. Dehydration:[PF1.1]  CMP suggestive of mild dehydration (sodium and chloride elevated- BUN 33, Mag sl elevated), likely secondary to poor oral intake and perhaps lack of frequent access to consume adequate fluids (due to mobility limitations).   -Gentle IV hydration with NS at 100 cc's per hour  - Monitor I&O's  -Push oral fluids as tolerated.[PF1.4]     #. UTI:[PF1.8] UA showing UTI. Patient not having sx's. Afebrile. Normal CBC.   -Oral Cipro 250 mg every 12 hours[PF1.4]      FEN:  -Regular diet as tolerated.  -Monitor BMP and replace electrolytes per protocol    Prophy:  -No VTE prophy as patient is up ad valorie and anticipate short observation stay   -Encourage ambulation as tolerated   -for GI prophy  -PRN Senna and Miralax    Consults:[PF1.1] SW and discharge planning with patient and family for more assistance at home.[PF1.7]     CODE STATUS:  FULL CODE[PF1.1]   (patient states she may papers at home, POLST).[PF1.7]     DISPOSITION: Family would like to take patient home again and have in home health care services set up for her. She will need discharge planning[PF1.1] as this seems an unlikely scenario[PF1.4].     Darlene Walker, APRN, CNP  Emergency Department Observation Unit    1910- Received call from Dr. Vazquez. He has not seen the patient but is  calling on behalf of Dr. Lopez who signed out report to him and a Resident. Patient has had full workup and is at [place]. Plan will be to admit for rehydration, with PT and OT evaluation in the AM. Will need discharge planning for increased home care services to facilitate remaining in her current living situation.[PF1.1]     2238 Patient arrived to unit.[PF1.10]     0700 Patient has UTI and will be treated with oral antibiotic[PF1.4]         Revision History        User Key Date/Time User Provider Type Action    > PF1.6 4/6/2017  3:24 PM FenstermDarlene mendoza APRN CNP Nurse Practitioner Sign     PF1.5 4/6/2017  3:07 PM Darlene Walker APRN CNP Nurse Practitioner      PF1.4 4/6/2017  2:50 PM FenstermDarlene mendoza APRN CNP Nurse Practitioner      PF1.8 4/6/2017  7:15 AM FenstermDarlene mendoza APRN CNP Nurse Practitioner      PF1.9 4/6/2017  7:12 AM Fenstermacher, Darlene Marely, APRN CNP Nurse Practitioner      PF1.7 4/6/2017  5:52 AM FenstermDarlene mendoza APRN CNP Nurse Practitioner      PF1.10 4/6/2017 12:51 AM FenstermDarlene mendoza APRN CNP Nurse Practitioner      PF1.3 4/5/2017  7:26 PM FenstermDarlene mendoza APRN CNP Nurse Practitioner      PF1.2 4/5/2017  7:25 PM FenstermDarlene mendoza APRN CNP Nurse Practitioner      PF1.1 4/5/2017  7:17 PM FenstermacheDarlene palmer APRN CNP Nurse Practitioner                   Discharge Summaries     No notes of this type exist for this encounter.      Consult Notes     No notes of this type exist for this encounter.         Progress Notes - Physician (Notes for yesterday and today)      Progress Notes by Mitul Rosenthal MD at 4/7/2017  8:52 AM     Author:  Mitul Rosenthal MD Service:  Emergency Medicine Author Type:  Physician    Filed:  4/7/2017  8:55 AM Date of Service:  4/7/2017  8:52 AM Note Created:  4/7/2017  8:52 AM    Status:  Signed :  Mitul Rosenthal MD (Physician)         8:52 AM  Patient seen and  examined, electronic medical record reviewed.  Plan discussed with JOANN.  Subjective: Patient states that she had 2 episodes of urination during the night but denies dysuria, frequency other than as noted above and urgency.  Patient does note that she is not particularly happy about recommendations for TCU placement.  Objective:[SK1.1]  Vitals:    04/06/17 2027 04/06/17 2240 04/07/17 0328 04/07/17 0752   BP: 137/78 117/81 91/61 136/86   BP Location: Left arm Left arm  Left arm   Pulse: 72 68 54 54   Resp:   14 16   Temp: 97.9  F (36.6  C) 98.1  F (36.7  C) 96.7  F (35.9  C) 97.7  F (36.5  C)   TempSrc: Oral Oral Axillary Oral   SpO2: 96% 98% 97% 97%   Weight:[SK1.2]         Patient appears overall somewhat slowed both in speech and neuromuscular activity.  Chest was clear to auscultation.  Her neurovascular exam regular rate and rhythm.  Abdomen was soft.[SK1.1]  Results for orders placed or performed during the hospital encounter of 04/05/17   Chest  XR, 1 view portable    Narrative    XR CHEST PORT 1 VW 4/5/2017 3:50 PM    HISTORY: Trauma.    COMPARISON: October 27, 2013.      Impression    IMPRESSION: There is patchy opacification of the left lung base. The  right lung is clear. Cannot exclude a small left effusion. No  pneumothorax.    ANGUS FERNÁNDEZ MD   XR Pelvis Port 1/2 Views    Narrative    XR PELVIS PORT 1/2 VW 4/5/2017 3:52 PM    HISTORY: Trauma.    COMPARISON: September 10, 2013.      Impression    IMPRESSION: No evidence of acute fracture or malalignment.    ANGUS FERNÁNDEZ MD   Chest CT w/o contrast    Narrative    CT CHEST WITHOUT CONTRAST  4/5/2017 6:03 PM    HISTORY: Left-sided opacity on a recent radiograph.     COMPARISON: A chest radiograph earlier today.    TECHNIQUE: Routine transverse CT imaging of the chest was performed  without contrast. Radiation dose for this scan was reduced using  automated exposure control, adjustment of the mA and/or kV according  to patient size, or iterative  reconstruction technique.    FINDINGS: The heart size is normal. No enlarged lymph node or other  abnormal mediastinal mass is seen. The thoracic aorta and pulmonary  arteries are unremarkable, allowing for the absence of contrast. The  lungs are clear. No pneumothorax is demonstrated. No pleural effusion  is identified. There appear to be old left posterior rib fractures. No  acute fracture or other osseous abnormality is seen. No chest wall  pathology is seen. The visualized upper abdomen is unremarkable.      Impression    IMPRESSION: Unremarkable unenhanced CT of the chest. Specifically, the  lungs are clear.    JEREMY HUIZAR MD   Head CT w/o contrast    Narrative    CT SCAN OF THE HEAD WITHOUT CONTRAST   4/5/2017 6:02 PM     HISTORY: Trauma. Parkinsonism.     TECHNIQUE:  Axial images of the head and coronal reformations without  IV contrast material. Radiation dose for this scan was reduced using  automated exposure control, adjustment of the mA and/or kV according  to patient size, or iterative reconstruction technique.    COMPARISON: 10/27/2013.    FINDINGS:  There is generalized atrophy of the brain.  There is low  attenuation in the white matter of the cerebral hemispheres consistent  with sequelae of small vessel ischemic disease. There is no evidence  of intracranial hemorrhage, mass, acute infarct or anomaly.     The visualized portions of the sinuses and mastoids appear normal.  There is no evidence of trauma.      Impression    IMPRESSION:   1. No acute abnormality.  2. Atrophy of the brain.  White matter changes consistent with  sequelae of small vessel ischemic disease. This is unchanged.    CLARISSE BOYD MD   Cervical spine CT w/o contrast    Narrative    CT CERVICAL SPINE WITHOUT CONTRAST   4/5/2017 6:02 PM     HISTORY: Trauma. Parkinson's disease.     TECHNIQUE: Axial images of the cervical spine were obtained without  intravenous contrast. Multiplanar reformations were  performed.  Radiation dose for this scan was reduced using automated exposure  control, adjustment of the mA and/or kV according to patient size, or  iterative reconstruction technique.    COMPARISON: None.    FINDINGS: There is no evidence of fracture. Motion artifacts limit  detail, however. There is multilevel degenerative disc disease  especially at C5-C6 and C6-C7. There is multilevel degenerative facet  arthropathy bilaterally. There is grade 1 subluxation of C4 on C5.  There is no paraspinous soft tissue swelling. Spinal canal is widely  patent.      Impression    IMPRESSION:  1. Degenerative changes.  2. No evidence of acute trauma.  3. Motion artifacts limit detail for small fractures.    CLARISSE BOYD MD   CBC with platelets differential   Result Value Ref Range    WBC 7.3 4.0 - 11.0 10e9/L    RBC Count 4.68 3.8 - 5.2 10e12/L    Hemoglobin 14.3 11.7 - 15.7 g/dL    Hematocrit 43.1 35.0 - 47.0 %    MCV 92 78 - 100 fl    MCH 30.6 26.5 - 33.0 pg    MCHC 33.2 31.5 - 36.5 g/dL    RDW 12.9 10.0 - 15.0 %    Platelet Count 217 150 - 450 10e9/L    Diff Method Automated Method     % Neutrophils 68.7 %    % Lymphocytes 22.3 %    % Monocytes 5.5 %    % Eosinophils 3.3 %    % Basophils 0.1 %    % Immature Granulocytes 0.1 %    Nucleated RBCs 0 0 /100    Absolute Neutrophil 5.0 1.6 - 8.3 10e9/L    Absolute Lymphocytes 1.6 0.8 - 5.3 10e9/L    Absolute Monocytes 0.4 0.0 - 1.3 10e9/L    Absolute Eosinophils 0.2 0.0 - 0.7 10e9/L    Absolute Basophils 0.0 0.0 - 0.2 10e9/L    Abs Immature Granulocytes 0.0 0 - 0.4 10e9/L    Absolute Nucleated RBC 0.0    Comprehensive metabolic panel   Result Value Ref Range    Sodium 145 (H) 133 - 144 mmol/L    Potassium 4.2 3.4 - 5.3 mmol/L    Chloride 110 (H) 94 - 109 mmol/L    Carbon Dioxide 29 20 - 32 mmol/L    Anion Gap 6 3 - 14 mmol/L    Glucose 88 70 - 99 mg/dL    Urea Nitrogen 33 (H) 7 - 30 mg/dL    Creatinine 0.78 0.52 - 1.04 mg/dL    GFR Estimate 74 >60 mL/min/1.7m2    GFR Estimate  If Black 90 >60 mL/min/1.7m2    Calcium 9.7 8.5 - 10.1 mg/dL    Bilirubin Total 0.6 0.2 - 1.3 mg/dL    Albumin 4.0 3.4 - 5.0 g/dL    Protein Total 7.3 6.8 - 8.8 g/dL    Alkaline Phosphatase 126 40 - 150 U/L    ALT 8 0 - 50 U/L    AST 22 0 - 45 U/L   Magnesium   Result Value Ref Range    Magnesium 2.4 (H) 1.6 - 2.3 mg/dL   Phosphorus   Result Value Ref Range    Phosphorus 3.2 2.5 - 4.5 mg/dL   TSH   Result Value Ref Range    TSH 3.89 0.40 - 4.00 mU/L   UA with Microscopic reflex to Culture   Result Value Ref Range    Color Urine Yellow     Appearance Urine Slightly Cloudy     Glucose Urine Negative NEG mg/dL    Bilirubin Urine Negative NEG    Ketones Urine Negative NEG mg/dL    Specific Gravity Urine 1.016 1.003 - 1.035    Blood Urine Negative NEG    pH Urine 6.5 5.0 - 7.0 pH    Protein Albumin Urine Negative NEG mg/dL    Urobilinogen mg/dL Normal 0.0 - 2.0 mg/dL    Nitrite Urine Negative NEG    Leukocyte Esterase Urine Large (A) NEG    Source Unknown     WBC Urine 27 (H) 0 - 2 /HPF    RBC Urine 2 0 - 2 /HPF    Squamous Epithelial /HPF Urine 5 (H) 0 - 1 /HPF    Transitional Epi 1 0 - 1 /HPF    Mucous Urine Present (A) NEG /LPF   Basic metabolic panel   Result Value Ref Range    Sodium 144 133 - 144 mmol/L    Potassium 4.0 3.4 - 5.3 mmol/L    Chloride 110 (H) 94 - 109 mmol/L    Carbon Dioxide 26 20 - 32 mmol/L    Anion Gap 8 3 - 14 mmol/L    Glucose 97 70 - 99 mg/dL    Urea Nitrogen 18 7 - 30 mg/dL    Creatinine 0.82 0.52 - 1.04 mg/dL    GFR Estimate 69 >60 mL/min/1.7m2    GFR Estimate If Black 84 >60 mL/min/1.7m2    Calcium 8.7 8.5 - 10.1 mg/dL   EKG 12-lead, tracing only   Result Value Ref Range    Interpretation ECG Click View Image link to view waveform and result    Urine Culture Aerobic Bacterial   Result Value Ref Range    Specimen Description Unspecified Urine     Special Requests Specimen received in preservative     Culture Micro >100,000 colonies/mL mixed urogenital kirsty     Micro Report Status FINAL  04/07/2017[SK1.2]      S1/plan: 66-year-old female who was admitted observation status post fall with negative extensive impinging.  PT consult recommending TCU awaiting social work/nurse care coordinator recommendations for placement.  Urine culture reveals no specific organism consistent with UTI in the setting of mild nocturia.  We'll complete three-day course of Ciprofloxacin.[SK1.1]     Revision History        User Key Date/Time User Provider Type Action    > SK1.2 4/7/2017  8:55 AM Mitul Rosenthal MD Physician Sign     SK1.1 4/7/2017  8:52 AM Mitul Rosenthal MD Physician             Progress Notes by Venkat Vazquez MD at 4/6/2017 12:25 PM     Author:  Venkat Vazquez MD Service:  Observation Author Type:  Physician    Filed:  4/6/2017 12:30 PM Date of Service:  4/6/2017 12:25 PM Note Created:  4/6/2017 12:25 PM    Status:  Signed :  Venkat Vazquez MD (Physician)         12:25 PM The patient was seen and examined by me and the case was discussed with the JOANN. We are in agreement with the assessment and plan.  66-year-old female living at home with her  who is in his 80s.  She has Parkinson's and struggles with ambulation she uses a wheelchair and a walker.  She currently has no assistance at home.  She fell 2 or 3 times yesterday and was evaluated in the emergency department with an extensive workup that showed mild dehydration however CT of the head and neck and chest were negative.  She was admitted to the observation unit for rehydration and PT OT consult with question of home health care versus a transfer to TCU or assisted living.    Physical exam:  General: Awake, alert, frail and chronically ill-appearing  Cardiac: Regular rate and rhythm grade 2 systolic murmur  Respiratory: Lungs clear  Abdomen: Soft nontender  Neurologic: Awake alert oriented, mild muscular rigidity no tremor.  Psychiatric: Flat depressed affect oriented ×3    Assessment and plan:  66-year-old female with frequent falls likely from Parkinson's and general deconditioning.  Living independently with  who is much older.  I do not see how she can safely be discharged back to home.  Appreciate PT OT and social work consults.  I suspect that she will fail this assessment and the recommendation will be for placement.  If somehow she is able to be safely discharged home, we would proceed in that direction however, I doubt this will be the finding in which case the options will be explained to the patient.  I suspect placement today may be unlikely.  I don't see a medical issue that should keep her on the Observation Unit beyond today.[RZ1.1]     Revision History        User Key Date/Time User Provider Type Action    > RZ1.1 4/6/2017 12:30 PM Venkat Vazquez MD Physician Sign                  Procedure Notes     No notes of this type exist for this encounter.         Progress Notes - Therapies (Notes from 04/04/17 through 04/07/17)      Progress Notes by Mikayla Moore PT at 4/6/2017  2:38 PM     Author:  Mikayla Moore PT Service:  (none) Author Type:  Physical Therapist    Filed:  4/6/2017  2:39 PM Date of Service:  4/6/2017  2:38 PM Note Created:  4/6/2017  2:39 PM    Status:  Signed :  Mikayla Moore PT (Physical Therapist)          04/06/17 1420   Quick Adds   Quick Adds Certification   Type of Visit Initial PT Evaluation       Present no   Language english   Living Environment   Lives With spouse   Living Arrangements house   Home Accessibility stairs to enter home;stairs (2 railings present)   Number of Stairs to Enter Home 5  (B rails)   Number of Stairs Within Home 0  (all needs met on main level)   Transportation Available car;family or friend will provide   Living Environment Comment Pt lives with  who is home and has been previously able to assist pt as needed.   Self-Care   Usual Activity Tolerance moderate   Current Activity  Tolerance poor   Regular Exercise no   Equipment Currently Used at Home walker, rolling;wheelchair   Activity/Exercise/Self-Care Comment Pt reports using fww in the home for short distance ambulation. Use of w/c for longer distances outside the home. Reports assist level depends on the day d/t hx of Parkinson's. Some days able to complete mobility without assist - other days she needs more help from either  or her children if they are around.    Functional Level Prior   Ambulation 1-->assistive equipment   Transferring 1-->assistive equipment   Fall history within last six months yes   Number of times patient has fallen within last six months (many - 3 day of admission + others prior to that)   Which of the above functional risks had a recent onset or change? ambulation;transferring;fall history   Prior Functional Level Comment PLOF varies from Mod I to Ax1 depending on day and how pt is feeling d/t Parkinsons   General Information   Onset of Illness/Injury or Date of Surgery - Date 04/05/17   Referring Physician Darlene Walker CNP   Patient/Family Goals Statement None stated   Pertinent History of Current Problem (include personal factors and/or comorbidities that impact the POC) 66 year old female with Hx Parkinson's disease, hypothyroidism, physical deconditioning - admitted following frequent falls at home (pt had at least 3 on day of admission)   Precautions/Limitations fall precautions   General Observations Pt supine in bed upon arrival. Soft spoken. Agreeable to therapy session.    General Info Comments Activity Orders: Ambulate with assist.    Cognitive Status Examination   Orientation orientation to person, place and time   Level of Consciousness alert   Follows Commands and Answers Questions 100% of the time   Personal Safety and Judgment intact   Memory intact   Pain Assessment   Patient Currently in Pain No   Posture    Posture Forward head position;Protracted shoulders;Kyphosis   Range  of Motion (ROM)   ROM Comment B LE WFL. Did not increased stiffness in R ankle - only able to achieve neutral foot positioning.    Strength   Strength Comments Demonstrates at least 3/5 B LE strength with functional mobility and MMT screen while seated EOB.    Bed Mobility   Bed Mobility Comments Supine>sitting EOB with Min-ModA of 1 and HOB elevated. Effortful for pt, needing assist to scoot fully to EOB. Sit>supine with ModA of 1 to bring B LE back into bed. Ax2 to boost up in bed.    Transfer Skills   Transfer Comments Sit<>stand from elevated EOB with Mod-MaxA of 1 and fww. Pt able to clear bottom from EOB, but relies on B LE leaning on bed to maintain upright posture - very weak LE. Pt unable to maintain standing longer than ~30 sec before needing to sit down. Heavy use of UE on fww to maintain upright posture d/t weakness.    Gait   Gait Comments NT - Not safe to initiate today d/t weakness and poor standing tolerance.   Balance   Balance Comments Seated: good - CGA for safety. Standing: poor - requires MaxA to maintain upright posture with use of fww. Very weak LE.    Sensory Examination   Sensory Perception Comments Pt denies any numbness or tingling in B LE.    General Therapy Interventions   Planned Therapy Interventions balance training;bed mobility training;gait training;neuromuscular re-education;ROM;strengthening;stretching;transfer training;home program guidelines;progressive activity/exercise   Clinical Impression   Criteria for Skilled Therapeutic Intervention evaluation only   PT Diagnosis Decreased functional mobility   Influenced by the following impairments Decreased strength and activity tolerance, impaired balance   Functional limitations due to impairments Impaired ability to functionally navigate in home or community   Clinical Presentation Stable/Uncomplicated   Clinical Presentation Rationale PMH   Clinical Decision Making (Complexity) Low complexity   Therapy Frequency` (1x eval only for  "safe disposition)   Predicted Duration of Therapy Intervention (days/wks) (1x eval only for safe disposition)   Anticipated Equipment Needs at Discharge (pt has fww and w/c at home)   Anticipated Discharge Disposition Transitional Care Facility   Risk & Benefits of therapy have been explained Yes   Patient, Family & other staff in agreement with plan of care Yes   Clinical Impression Comments Pt presents with significant weakness and currently not safe to return to home. Currently recommend discharge to TCU to progress functional strengthening and safety and IND with mobility.   Therapy Certification   Start of care date 04/06/17   Certification date from 04/06/17   Certification date to 04/07/17   Medical Diagnosis falls at home, hx of Parkinsons   [facility] AM-PAC TM \"6 Clicks\"   2016, Trustees of [facility], under license to [employer].  All rights reserved.   6 Clicks Short Forms Basic Mobility Inpatient Short Form   [facility] AM-PAC  \"6 Clicks\" V.2 Basic Mobility Inpatient Short Form   1. Turning from your back to your side while in a flat bed without using bedrails? 3 - A Little   2. Moving from lying on your back to sitting on the side of a flat bed without using bedrails? 3 - A Little   3. Moving to and from a bed to a chair (including a wheelchair)? 2 - A Lot   4. Standing up from a chair using your arms (e.g., wheelchair, or bedside chair)? 2 - A Lot   5. To walk in hospital room? 1 - Total   6. Climbing 3-5 steps with a railing? 1 - Total   Basic Mobility Raw Score (Score out of 24.Lower scores equate to lower levels of function) 12   Total Evaluation Time   Total Evaluation Time (Minutes) 20[SG1.1]        Revision History        User Key Date/Time User Provider Type Action    > SG1.1 4/6/2017  2:39 PM Mikayla Moore, PT Physical Therapist Sign                                                      INTERAGENCY TRANSFER FORM - LAB / IMAGING / EKG / EMG RESULTS   4/5/2017            "            UNIT 6D OBSERVATION [facility] BANK: [phone]            Unresulted Labs     None         Lab Results - 3 Days      Urine Culture Aerobic Bacterial [[id number]]  Resulted: 04/07/17 0731, Result status: Final result    Ordering provider: Gage Loza MD  04/06/17 0040 Resulting lab: [facility]    Specimen Information    Type Source Collected On     04/06/17 0040          Components       Value Reference Range Flag Lab   Specimen Description Unspecified Urine   51   Special Requests Specimen received in preservative   75   Culture Micro >100,000 colonies/mL mixed urogenital kirsty   225   Micro Report Status FINAL 04/07/2017   225            Basic metabolic panel [[id number]] (Abnormal)  Resulted: 04/07/17 0714, Result status: Final result    Ordering provider: Vivek Allen PA  04/07/17 0525 Resulting lab: [facility]    Specimen Information    Type Source Collected On   Blood  04/07/17 0636          Components       Value Reference Range Flag Lab   Sodium 144 133 - 144 mmol/L  51   Potassium 4.0 3.4 - 5.3 mmol/L  51   Chloride 110 94 - 109 mmol/L H 51   Carbon Dioxide 26 20 - 32 mmol/L  51   Anion Gap 8 3 - 14 mmol/L  51   Glucose 97 70 - 99 mg/dL  51   Urea Nitrogen 18 7 - 30 mg/dL  51   Creatinine 0.82 0.52 - 1.04 mg/dL  51   GFR Estimate 69 >60 mL/min/1.7m2  51   Comment:  Non  GFR Calc   GFR Estimate If Black 84 >60 mL/min/1.7m2  51   Comment:  African American GFR Calc   Calcium 8.7 8.5 - 10.1 mg/dL  51            UA with Microscopic reflex to Culture [[id number]] (Abnormal)  Resulted: 04/06/17 0110, Result status: Final result    Ordering provider: Gage Loza MD  04/05/17 1526 Resulting lab: [facility]    Specimen Information    Type Source Collected On   Urine  04/06/17 0040          Components       Value Reference Range Flag Lab   Color Urine Yellow    51   Appearance Urine Slightly Cloudy   51   Glucose Urine Negative NEG mg/dL  51   Bilirubin Urine Negative NEG  51   Ketones Urine Negative NEG mg/dL  51   Specific Gravity Urine 1.016 1.003 - 1.035  51   Blood Urine Negative NEG  51   pH Urine 6.5 5.0 - 7.0 pH  51   Protein Albumin Urine Negative NEG mg/dL  51   Urobilinogen mg/dL Normal 0.0 - 2.0 mg/dL  51   Nitrite Urine Negative NEG  51   Leukocyte Esterase Urine Large NEG A 51   Source Unknown   51   WBC Urine 27 0 - 2 /HPF H 51   RBC Urine 2 0 - 2 /HPF  51   Squamous Epithelial /HPF Urine 5 0 - 1 /HPF H 51   Transitional Epi 1 0 - 1 /HPF  51   Mucous Urine Present NEG /LPF A 51            TSH [[id number]]  Resulted: 04/05/17 1647, Result status: Final result    Ordering provider: Bruno Lopez MD  04/05/17 1518 Resulting lab: [facility]    Specimen Information    Type Source Collected On   Blood  04/05/17 1608          Components       Value Reference Range Flag Lab   TSH 3.89 0.40 - 4.00 mU/L  13            Comprehensive metabolic panel [[id number]] (Abnormal)  Resulted: 04/05/17 1639, Result status: Final result    Ordering provider: Bruno Lopez MD  04/05/17 1516 Resulting lab: [facility]    Specimen Information    Type Source Collected On   Blood  04/05/17 1608          Components       Value Reference Range Flag Lab   Sodium 145 133 - 144 mmol/L H 13   Potassium 4.2 3.4 - 5.3 mmol/L  13   Chloride 110 94 - 109 mmol/L H 13   Carbon Dioxide 29 20 - 32 mmol/L  13   Anion Gap 6 3 - 14 mmol/L  13   Glucose 88 70 - 99 mg/dL  13   Urea Nitrogen 33 7 - 30 mg/dL H 13   Creatinine 0.78 0.52 - 1.04 mg/dL  13   GFR Estimate 74 >60 mL/min/1.7m2  13   Comment:  Non  GFR Calc   GFR Estimate If Black 90 >60 mL/min/1.7m2  13   Comment:  African American GFR Calc   Calcium 9.7 8.5 - 10.1 mg/dL  13   Bilirubin Total 0.6 0.2 - 1.3 mg/dL  13   Albumin 4.0 3.4 - 5.0 g/dL  13   Protein Total 7.3  6.8 - 8.8 g/dL  13   Alkaline Phosphatase 126 40 - 150 U/L  13   ALT 8 0 - 50 U/L  13   AST 22 0 - 45 U/L  13            Magnesium [[id number]] (Abnormal)  Resulted: 04/05/17 1639, Result status: Final result    Ordering provider: Bruno Lopez MD  04/05/17 1516 Resulting lab: [facility]    Specimen Information    Type Source Collected On   Blood  04/05/17 1608          Components       Value Reference Range Flag Lab   Magnesium 2.4 1.6 - 2.3 mg/dL H 13            Phosphorus [[id number]]  Resulted: 04/05/17 1639, Result status: Final result    Ordering provider: Bruno Lopez MD  04/05/17 1516 Resulting lab: [facility]    Specimen Information    Type Source Collected On   Blood  04/05/17 1608          Components       Value Reference Range Flag Lab   Phosphorus 3.2 2.5 - 4.5 mg/dL  13            CBC with platelets differential [[id number]]  Resulted: 04/05/17 1623, Result status: Final result    Ordering provider: Bruno Lopez MD  04/05/17 1516 Resulting lab: [facility]    Specimen Information    Type Source Collected On   Blood  04/05/17 1608          Components       Value Reference Range Flag Lab   WBC 7.3 4.0 - 11.0 10e9/L  13   RBC Count 4.68 3.8 - 5.2 10e12/L  13   Hemoglobin 14.3 11.7 - 15.7 g/dL  13   Hematocrit 43.1 35.0 - 47.0 %  13   MCV 92 78 - 100 fl  13   MCH 30.6 26.5 - 33.0 pg  13   MCHC 33.2 31.5 - 36.5 g/dL  13   RDW 12.9 10.0 - 15.0 %  13   Platelet Count 217 150 - 450 10e9/L  13   Diff Method Automated Method   13   % Neutrophils 68.7 %  13   % Lymphocytes 22.3 %  13   % Monocytes 5.5 %  13   % Eosinophils 3.3 %  13   % Basophils 0.1 %  13   % Immature Granulocytes 0.1 %  13   Nucleated RBCs 0 0 /100  13   Absolute Neutrophil 5.0 1.6 - 8.3 10e9/L  13   Absolute Lymphocytes 1.6 0.8 - 5.3 10e9/L  13   Absolute Monocytes 0.4 0.0 - 1.3 10e9/L  13   Absolute Eosinophils 0.2 0.0 - 0.7 10e9/L  13   Absolute  Basophils 0.0 0.0 - 0.2 10e9/L  13   Abs Immature Granulocytes 0.0 0 - 0.4 10e9/L  13   Absolute Nucleated RBC 0.0   13            Testing Performed By     Lab - Abbreviation Name Director Address Valid Date Range    13 - Unknown [facility] Unknown [address] [facility] [address] 01/15/15 0916 - Present    51 - Unknown [facility] Unknown [address] [facility] [address] 12/31/14 1010 - Present    75 - Unknown [facility] Unknown [address] [facility] [address] 01/15/15 1019 - Present    225 - Unknown [facility] Unknown [address] [facility] [address] 12/19/14 0954 - Present               Imaging Results - 3 Days      Cervical spine CT w/o contrast [[id number]]  Resulted: 04/05/17 2043, Result status: Final result    Ordering provider: Gage Loza MD  04/05/17 1631 Resulted by: Yonathan Knight MD    Performed: 04/05/17 1759 - 04/05/17 1802 Resulting lab: RADIOLOGY RESULTS    Narrative:       CT CERVICAL SPINE WITHOUT CONTRAST   4/5/2017 6:02 PM     HISTORY: Trauma. Parkinson's disease.     TECHNIQUE: Axial images of the cervical spine were obtained without  intravenous contrast. Multiplanar reformations were performed.  Radiation dose for this scan was reduced using automated exposure  control, adjustment of the mA and/or kV according to patient size, or  iterative reconstruction technique.    COMPARISON: None.    FINDINGS: There is no evidence of fracture. Motion artifacts limit  detail, however. There is multilevel degenerative disc disease  especially at C5-C6 and C6-C7. There is multilevel degenerative facet  arthropathy bilaterally. There is grade 1 subluxation of C4 on C5.  There is no paraspinous soft tissue swelling. Spinal canal is widely  patent.      Impression:       IMPRESSION:  1. Degenerative changes.  2. No evidence of acute trauma.  3.  Motion artifacts limit detail for small fractures.    YONATHAN BOYD MD      Head CT w/o contrast [[id number]]  Resulted: 04/05/17 2043, Result status: Final result    Ordering provider: Gage Loza MD  04/05/17 1631 Resulted by: Yonathan Boyd MD    Performed: 04/05/17 1801 - 04/05/17 1802 Resulting lab: RADIOLOGY RESULTS    Narrative:       CT SCAN OF THE HEAD WITHOUT CONTRAST   4/5/2017 6:02 PM     HISTORY: Trauma. Parkinsonism.     TECHNIQUE:  Axial images of the head and coronal reformations without  IV contrast material. Radiation dose for this scan was reduced using  automated exposure control, adjustment of the mA and/or kV according  to patient size, or iterative reconstruction technique.    COMPARISON: 10/27/2013.    FINDINGS:  There is generalized atrophy of the brain.  There is low  attenuation in the white matter of the cerebral hemispheres consistent  with sequelae of small vessel ischemic disease. There is no evidence  of intracranial hemorrhage, mass, acute infarct or anomaly.     The visualized portions of the sinuses and mastoids appear normal.  There is no evidence of trauma.      Impression:       IMPRESSION:   1. No acute abnormality.  2. Atrophy of the brain.  White matter changes consistent with  sequelae of small vessel ischemic disease. This is unchanged.    YONATHAN BOYD MD      Chest CT w/o contrast [[id number]]  Resulted: 04/05/17 1837, Result status: Final result    Ordering provider: Gage Loza MD  04/05/17 1631 Resulted by: Jarvis Angel MD    Performed: 04/05/17 1802 - 04/05/17 1803 Resulting lab: RADIOLOGY RESULTS    Narrative:       CT CHEST WITHOUT CONTRAST  4/5/2017 6:03 PM    HISTORY: Left-sided opacity on a recent radiograph.     COMPARISON: A chest radiograph earlier today.    TECHNIQUE: Routine transverse CT imaging of the chest was performed  without contrast. Radiation dose for this scan was reduced using  automated exposure control,  adjustment of the mA and/or kV according  to patient size, or iterative reconstruction technique.    FINDINGS: The heart size is normal. No enlarged lymph node or other  abnormal mediastinal mass is seen. The thoracic aorta and pulmonary  arteries are unremarkable, allowing for the absence of contrast. The  lungs are clear. No pneumothorax is demonstrated. No pleural effusion  is identified. There appear to be old left posterior rib fractures. No  acute fracture or other osseous abnormality is seen. No chest wall  pathology is seen. The visualized upper abdomen is unremarkable.      Impression:       IMPRESSION: Unremarkable unenhanced CT of the chest. Specifically, the  lungs are clear.    JEREMY HUIZAR MD      XR Pelvis Port 1/2 Views [[id number]]  Resulted: 04/05/17 1558, Result status: Final result    Ordering provider: aGge Loza MD  04/05/17 1525 Resulted by: Lobito Fernández MD    Performed: 04/05/17 1533 - 04/05/17 1552 Resulting lab: RADIOLOGY RESULTS    Narrative:       XR PELVIS PORT 1/2 VW 4/5/2017 3:52 PM    HISTORY: Trauma.    COMPARISON: September 10, 2013.      Impression:       IMPRESSION: No evidence of acute fracture or malalignment.    LOBITO FERNÁNDEZ MD      Chest  XR, 1 view portable [[id number]]  Resulted: 04/05/17 1557, Result status: Final result    Ordering provider: Gage Loza MD  04/05/17 1525 Resulted by: Lobito Fernández MD    Performed: 04/05/17 1533 - 04/05/17 1550 Resulting lab: RADIOLOGY RESULTS    Narrative:       XR CHEST PORT 1 VW 4/5/2017 3:50 PM    HISTORY: Trauma.    COMPARISON: October 27, 2013.      Impression:       IMPRESSION: There is patchy opacification of the left lung base. The  right lung is clear. Cannot exclude a small left effusion. No  pneumothorax.    LOBITO FERNÁNDEZ MD      Testing Performed By     Lab - Abbreviation Name Director Address Valid Date Range    104 - Rad Rslts RADIOLOGY RESULTS Unknown Unknown 02/16/05 1553 - Present             Encounter-Level Documents:     There are no encounter-level documents.      Order-Level Documents:     There are no order-level documents.

## 2017-04-05 NOTE — PHARMACY
Prescriber Notification Note    The pharmacist has communicated with this patient's provider regarding a concern or therapy recommendation.    Notified Person: Dr ALDEN Lopez  Date/Time of Notification: 04/05/2017 @ 7547  Interaction: phone  Concern/Recommendation:clarification of oreder for Requip 12mg daily.     Comments/Additional Details:Received verbal order to change to Requip 4mg t.i.d.

## 2017-04-05 NOTE — IP AVS SNAPSHOT
` ` Patient Information     Patient Name Sex     Brenda Rose ([id number]) Female 1950       Room Bed    2024 6513-02      Patient Demographics     Address Phone    [address] [facility] [address] [phone] (Home)  NONE (Mobile)      Patient Ethnicity & Race     Ethnic Group Patient Race    American White      Emergency Contact(s)     Name Relation Home Work Mobile    BENITA VILLAREAL Spouse [phone]    LORE PRUITT Sister [phone]    FORREST ROSE Daughter [phone]    BENJIE PRUITT Daughter [phone]      Documents on File        Status Date Received Description       Documents for the Patient    Insurance Card  04     Face Sheet  () 04     Privacy Notice - [facility] Received 11     External Medication Information Consent Accepted () 11     Patient ID Received 12     Consent for Services - Hospital/Clinic Received () 11     Insurance Card Received 11     Face Sheet Received () 11     Insurance Card Received 12     Consent for EHR Access  13 Copied from existing Consent for services - C/HOD collected on 2011    [facility] Specified Other       Consent for Services - Hospital/Clinic Received () 13     [facility] Specified Other Received () 13 PAS    External Medication Information Consent Accepted 10/07/13     Privacy Notice - [facility] Received 13     Consent for Services - Hospital/Clinic Received () 14     Consent for Services/Privacy Notice - Hospital/Clinic       Consent for Services/Privacy Notice - Hospital/Clinic Received 16     Patient ID  (Deleted)  NO ID    Insurance Card  (Deleted)  SAME INS PER PT.       Documents for the Encounter    CMS IM for Patient Signature Received 17     Discharge Attachment   FALL DUE TO DIZZINESS, WEAKNESS, OR LOSS OF BALANCE  (ENGLISH)    Discharge Attachment  (Deleted)  FALL, UNCERTAIN CAUSE (ENGLISH)      Admission Information     Attending Provider Admitting Provider Admission Type Admission Date/Time    Venkat Vazquez MD Thompson, James, MD Emergency 04/05/17  1409    Discharge Date Hospital Service Auth/Cert Status Service Area     Emergency Medicine Incomplete [facility]    Unit Room/Bed Admission Status       UU U6D OBSERVATION 6513/6513-02 Admission (Confirmed)       Admission     Complaint    Fall      Hospital Account     Name Acct ID Class Status Primary Coverage    Brenda Rose [id number] Observation Open MEDICARE - MEDICARE            Guarantor Account (for Hospital Account #[id number])     Name Relation to Pt Service Area Active? Acct Type    Brenda Rose  FCS Yes Personal/Family    Address Phone          [address] [place] [address] [phone](H)              Coverage Information (for Hospital Account #[id number])     1. MEDICARE/MEDICARE     F/O Payor/Plan Precert #    MEDICARE/MEDICARE     Subscriber Subscriber #    Brenda Rose [id number]    Address Phone    [employer]  [address]  [place] [address] [phone]          2. [employer]CARE/[facility] SELECTCARE     F/O Payor/Plan Precert #    SELECTCARE/[facility] SELECTCARE     Subscriber Subscriber #    Brenda Rose [id number]    Address Phone    [address] [id number]  [place] [address]

## 2017-04-05 NOTE — PHARMACY-ADMISSION MEDICATION HISTORY
Admission Medication History status for the 4/5/2017 admission is complete.  See EPIC admission navigator for Prior to Admission medications.    Medication history sources:  Patient     Medication history source reliability: Good    Medication adherence:  Good    Changes made to PTA medication list (reason)  Added: Fish Oil 1000 mg (per patient)  Deleted: None  Changed: None    Additional medication history information (including reliability of information, actions taken by pharmacist): Patient medication history was reliable. She is well informed about her medication names, strengths, and doses. She takes her Sinemet 5x a day at 8am, noon, 4pm, 8pm and midnight.     Time spent in this activity: 10 minutes     Medication history completed by: Gurmeet Pappas, Pharmacy Intern     I have reviewed the medication list with the student, and it is accurate and up to date to the best of my knowledge.  Olimpia Wu, PharmD      Prior to Admission medications    Medication Sig Last Dose Taking? Auth Provider   Ropinirole HCl (REQUIP XL) 12 MG TB24 Take 1 tablet by mouth qPM 4/4/2017 at pm Yes Reported, Patient   fish oil-omega-3 fatty acids 1000 MG capsule Take 1 g by mouth daily 4/4/2017 at pm Yes Unknown, Entered By History   carbidopa-levodopa (SINEMET)  MG per tablet Take 2 tablets by mouth 5 times daily 4/5/2017 at 1200 Yes Unknown, Entered By History   naproxen sodium (ALEVE) 220 MG capsule Take 220 mg by mouth 2 times daily as needed 4/4/2017 at pm Yes Héctor Conde MD   calcium carb 1250 mg, 500 mg [place] IRA,/vitamin D 200 units (OSCAL WITH D) 500-200 MG-UNIT per tablet Take 1 tablet by mouth 2 times daily (with meals) 4/5/2017 at Unknown time Yes Héctor Conde MD

## 2017-04-05 NOTE — ED PROVIDER NOTES
History     Chief Complaint   Patient presents with     Fall     slid off the toilet today, family feels they are no longer able to care for patient at home     HPI  Brenda Clancy is a 66 year old female with Hx Parkinson's disease, hypothyroidism, physical deconditioning, BIBA p/w falling with imbalance.  Pt states that she was resting on the toilet when she slid forward off the toilet and struck her forehead.  Pt states that she didn't hurt herself, no LoC.  No HA, change in vision, no dizziness/vertigo, no spells.  Pt was too weak to lift herself back off the floor.  She was on the floor for about 5 minutes before her  heard her and came to rescue her.  He was too weak to lift her so EMS was called.  EMS reports that the pt has fallen at least 3 times today, and pt endorses frequent daily falls.  Pt ambulates at home with a walker.    Pt has lived in some sort of facility (LTF or OT Rehab) in the past, but has lived at home for the past 1+ year with family.  Family would like for pt to be stabilized and then return home.  Family is seeking additional home health care to assist pt w/ morning ADLs.    Pt denies HA, fever, chills, cough, difficulty breathing, palpitations or chest pain, nausea, vomiting, diarrhea, constipation, sudden weakness, MSK or joint pains, vertigo.  No recent illnesses or recent medication changes.    I have reviewed the Medications, Allergies, Past Medical and Surgical History, and Social History in the Epic system.    Review of Systems   Constitutional: Positive for fatigue. Negative for chills, diaphoresis and fever.   HENT: Negative for congestion, facial swelling, hearing loss and sore throat.    Eyes: Negative for visual disturbance.   Respiratory: Negative for apnea, cough, chest tightness and shortness of breath.    Cardiovascular: Negative for chest pain.   Gastrointestinal: Negative for abdominal pain, constipation, diarrhea, nausea and vomiting.    Genitourinary: Negative for difficulty urinating.   Musculoskeletal: Negative for arthralgias and neck stiffness.   Skin: Negative for color change.   Neurological: Positive for tremors and weakness. Negative for dizziness, seizures, syncope, numbness and headaches.   Psychiatric/Behavioral: Negative for confusion.      ROS: 10 point ROS neg other than the symptoms noted above in the HPI.    Past Medical History:   Diagnosis Date     Bunion 4/11/00    RIGHT FIRST TOE     Cyst of Bartholin's gland     COLPOSCOPY PER PT -EARLY '80'S - WAS OKAY     Delirium 10/27/2013     HYPOTHYROIDISM      Paralysis agitans (H) 3/13/01    PARKINSON 'S DZ - DR CRAWFORD,NEUROLOGY     Symptomatic menopausal or female climacteric states 1/29/01       Past Surgical History:   Procedure Laterality Date     C NONSPECIFIC PROCEDURE      S/P EXCISED GANGLION,PALM LEFT HAND     C NONSPECIFIC PROCEDURE  4/13/00    [facility] BUNIONECTOMY RIGHT FIRST TOE       Family History   Problem Relation Age of Onset     CANCER Mother      UTERINE     DIABETES Mother      ADULT ONSET     Cancer - colorectal Father      DIABETES Father      ADULT ONSET     Breast Cancer Sister        Social History   Substance Use Topics     Smoking status: Former Smoker     Quit date: 4/8/1970     Smokeless tobacco: Never Used     Alcohol use No      Details   Ropinirole HCl (REQUIP XL) 12 MG TB24 Take 1 tablet by mouth every evening , Historical      fish oil-omega-3 fatty acids 1000 MG capsule Take 1 g by mouth daily, Historical      carbidopa-levodopa (SINEMET)  MG per tablet Take 2 tablets by mouth 5 times daily, Historical      naproxen sodium (ALEVE) 220 MG capsule Take 220 mg by mouth 2 times daily as needed, Disp-60 capsule, No Print Out      calcium carb 1250 mg, 500 mg [place],/vitamin D 200 units (OSCAL WITH D) 500-200 MG-UNIT per tablet Take 1 tablet by mouth 2 times daily (with meals), Disp-90 tablet, No Print Out               Physical Exam   BP: 95/55  Pulse:  66  Temp: 97.4  F (36.3  C)  Resp: 16  Weight: 53.1 kg (117 lb)  SpO2: 96 %  Physical Exam   Constitutional: She is oriented to person, place, and time.   Pt laying in bed, orofacial dyskinesias.   HENT:   Head: Normocephalic and atraumatic.   Very superficial 3cm vertical scratch/bruise on left forehead.  No raccoon eyes, no dong's sign, no other ecchymoses.   Eyes: Conjunctivae and EOM are normal. Pupils are equal, round, and reactive to light. Right eye exhibits no discharge. Left eye exhibits no discharge. No scleral icterus.   Neck: Normal range of motion. Neck supple.   Cardiovascular: Normal rate, regular rhythm, normal heart sounds and intact distal pulses.  Exam reveals no gallop and no friction rub.    No murmur heard.  Pulmonary/Chest: Effort normal and breath sounds normal. No respiratory distress. She has no wheezes. She has no rales. She exhibits no tenderness.   Abdominal: Soft. Bowel sounds are normal. She exhibits no distension and no mass. There is no tenderness. There is no rebound and no guarding.   Musculoskeletal: She exhibits no edema or tenderness.   Neurological: She is alert and oriented to person, place, and time. No cranial nerve deficit. Coordination normal.   CNs grossly intact.  Eyes with PERRLA, EOM intact, no visual field deficits or reduction in acuity appreciated.  Motor: Left UE: 4/5 on biceps, triceps, hand   Right UE: 4-/5 on biceps, triceps, hand   Left LE: 4/5 on hip flexion, ankle dorsiflexion/plantarflexion  Right LE: 4-/5 on hip flexion, ankle dorsiflexion/plantarflexion  No sensory deficits on face, UEs, LEs  Orofacial and RE parkinsonian tremors   Skin: Skin is dry. No rash noted. No erythema.   Nursing note and vitals reviewed.      ED Course     ED Course     Procedures             EKG Interpretation:      Interpreted by Bruno Lopez  Time reviewed: 4/5 1630  Symptoms at time of EKG: decrease in balance, parkinsonian tremors   Rhythm: junctional  Rate: 57  bpm  Axis: normal  Ectopy: none  Conduction: Possible LVH, cannot r/o Anterior infarct, unknown age  ST Segments/ T Waves: No ST-T wave changes  Q Waves: none  Comparison to prior: Junctional rhythm, LVH and ?anterior infarct are new features    Clinical Impression: abnormal EKG          Critical Care time:  none          Results for orders placed or performed during the hospital encounter of 04/05/17   Chest  XR, 1 view portable    Narrative    XR CHEST PORT 1 VW 4/5/2017 3:50 PM    HISTORY: Trauma.    COMPARISON: October 27, 2013.      Impression    IMPRESSION: There is patchy opacification of the left lung base. The  right lung is clear. Cannot exclude a small left effusion. No  pneumothorax.    ANGUS FERNÁNDEZ MD   XR Pelvis Port 1/2 Views    Narrative    XR PELVIS PORT 1/2 VW 4/5/2017 3:52 PM    HISTORY: Trauma.    COMPARISON: September 10, 2013.      Impression    IMPRESSION: No evidence of acute fracture or malalignment.    ANGUS FERNÁNDEZ MD   Chest CT w/o contrast    Narrative    CT CHEST WITHOUT CONTRAST  4/5/2017 6:03 PM    HISTORY: Left-sided opacity on a recent radiograph.     COMPARISON: A chest radiograph earlier today.    TECHNIQUE: Routine transverse CT imaging of the chest was performed  without contrast. Radiation dose for this scan was reduced using  automated exposure control, adjustment of the mA and/or kV according  to patient size, or iterative reconstruction technique.    FINDINGS: The heart size is normal. No enlarged lymph node or other  abnormal mediastinal mass is seen. The thoracic aorta and pulmonary  arteries are unremarkable, allowing for the absence of contrast. The  lungs are clear. No pneumothorax is demonstrated. No pleural effusion  is identified. There appear to be old left posterior rib fractures. No  acute fracture or other osseous abnormality is seen. No chest wall  pathology is seen. The visualized upper abdomen is unremarkable.      Impression    IMPRESSION: Unremarkable  unenhanced CT of the chest. Specifically, the  lungs are clear.    JEREMY HUIZAR MD   Head CT w/o contrast    Narrative    CT SCAN OF THE HEAD WITHOUT CONTRAST   4/5/2017 6:02 PM     HISTORY: Trauma. Parkinsonism.     TECHNIQUE:  Axial images of the head and coronal reformations without  IV contrast material. Radiation dose for this scan was reduced using  automated exposure control, adjustment of the mA and/or kV according  to patient size, or iterative reconstruction technique.    COMPARISON: 10/27/2013.    FINDINGS:  There is generalized atrophy of the brain.  There is low  attenuation in the white matter of the cerebral hemispheres consistent  with sequelae of small vessel ischemic disease. There is no evidence  of intracranial hemorrhage, mass, acute infarct or anomaly.     The visualized portions of the sinuses and mastoids appear normal.  There is no evidence of trauma.      Impression    IMPRESSION:   1. No acute abnormality.  2. Atrophy of the brain.  White matter changes consistent with  sequelae of small vessel ischemic disease. This is unchanged.   Cervical spine CT w/o contrast    Narrative    CT CERVICAL SPINE WITHOUT CONTRAST   4/5/2017 6:02 PM     HISTORY: Trauma. Parkinson's disease.     TECHNIQUE: Axial images of the cervical spine were obtained without  intravenous contrast. Multiplanar reformations were performed.  Radiation dose for this scan was reduced using automated exposure  control, adjustment of the mA and/or kV according to patient size, or  iterative reconstruction technique.    COMPARISON: None.    FINDINGS: There is no evidence of fracture. Motion artifacts limit  detail, however. There is multilevel degenerative disc disease  especially at C5-C6 and C6-C7. There is multilevel degenerative facet  arthropathy bilaterally. There is grade 1 subluxation of C4 on C5.  There is no paraspinous soft tissue swelling. Spinal canal is widely  patent.      Impression    IMPRESSION:  1.  Degenerative changes.  2. No evidence of acute trauma.  3. Motion artifacts limit detail for small fractures.   CBC with platelets differential   Result Value Ref Range    WBC 7.3 4.0 - 11.0 10e9/L    RBC Count 4.68 3.8 - 5.2 10e12/L    Hemoglobin 14.3 11.7 - 15.7 g/dL    Hematocrit 43.1 35.0 - 47.0 %    MCV 92 78 - 100 fl    MCH 30.6 26.5 - 33.0 pg    MCHC 33.2 31.5 - 36.5 g/dL    RDW 12.9 10.0 - 15.0 %    Platelet Count 217 150 - 450 10e9/L    Diff Method Automated Method     % Neutrophils 68.7 %    % Lymphocytes 22.3 %    % Monocytes 5.5 %    % Eosinophils 3.3 %    % Basophils 0.1 %    % Immature Granulocytes 0.1 %    Nucleated RBCs 0 0 /100    Absolute Neutrophil 5.0 1.6 - 8.3 10e9/L    Absolute Lymphocytes 1.6 0.8 - 5.3 10e9/L    Absolute Monocytes 0.4 0.0 - 1.3 10e9/L    Absolute Eosinophils 0.2 0.0 - 0.7 10e9/L    Absolute Basophils 0.0 0.0 - 0.2 10e9/L    Abs Immature Granulocytes 0.0 0 - 0.4 10e9/L    Absolute Nucleated RBC 0.0    Comprehensive metabolic panel   Result Value Ref Range    Sodium 145 (H) 133 - 144 mmol/L    Potassium 4.2 3.4 - 5.3 mmol/L    Chloride 110 (H) 94 - 109 mmol/L    Carbon Dioxide 29 20 - 32 mmol/L    Anion Gap 6 3 - 14 mmol/L    Glucose 88 70 - 99 mg/dL    Urea Nitrogen 33 (H) 7 - 30 mg/dL    Creatinine 0.78 0.52 - 1.04 mg/dL    GFR Estimate 74 >60 mL/min/1.7m2    GFR Estimate If Black 90 >60 mL/min/1.7m2    Calcium 9.7 8.5 - 10.1 mg/dL    Bilirubin Total 0.6 0.2 - 1.3 mg/dL    Albumin 4.0 3.4 - 5.0 g/dL    Protein Total 7.3 6.8 - 8.8 g/dL    Alkaline Phosphatase 126 40 - 150 U/L    ALT 8 0 - 50 U/L    AST 22 0 - 45 U/L   Magnesium   Result Value Ref Range    Magnesium 2.4 (H) 1.6 - 2.3 mg/dL   Phosphorus   Result Value Ref Range    Phosphorus 3.2 2.5 - 4.5 mg/dL   TSH   Result Value Ref Range    TSH 3.89 0.40 - 4.00 mU/L   EKG 12-lead, tracing only   Result Value Ref Range    Interpretation ECG Click View Image link to view waveform and result          Imagin. Pelvic XR: No  hip frx pathology appreciated.  2. CXR: No spinal frx appreciated.  Left mid-lower lung showing increased lucency in a diffuse pattern, c/f pneumonia.  3. Chest CT: no abnormalities noted, normal exam.  4. Head CT: no acute abnormalities.  Stable atrophy.  5. C-spine CT: no acute abnormalities.  Degenerative changes.    Head/C-spine/Chest CT w/o contrast:  (pending)    Assessments & Plan (with Medical Decision Making)   66F w/ PMH Parkinson's disease, physical deconditioning, BIBA after multiple falls.  Pt has had multiple recent falls.    ## Falls  DDx includes unlikely-but-serious head/c-spine injuries, including CVAs such as SDH, SAH.  Pt has no significant bruising of head or neck, no LoC, no HA, no change in vision or level of consciousness, ROM of head/neck, no difficulty speaking or understanding, no sensory deficits.  Head/C-spine CTs negative for spinal injuries or cranial bleeds.    More likely etiologies of pt's falls include poor balance/coordination in the setting of under-controlled Parkinson's disease with physical deconditioning.  This is supported by pt's relative unilateral characteristic tremors and weakness.  Unsure when last Neuro clinic visit occurred.  Given new falls, recommend pt f/u with outpatient Neuro clinic.  Family requires additional care in order for pt to remain home.    ## c/f pneumonia  Pt's CXR c/f increased cloudy opacity in LLL.  Pt denies difficulty breathing, no cough or fever, no SOB.  No abnormalities noted on Pulmonary portion of physical exam.  Chest CT did not reveal any abnormalities, making pneumonia unlikely.    ## Relative azotemia  Pt's CMP significant for elevated BUN/Cr ratio.  This could represent recent bleed, or relative dehydration.  Pt did not endorse blood in stools.  CT scans can detect cranial bleeds.  Meanwhile, pt would benefit from fluid replenishment.    PLAN:  1. Ordered CT head, C-spine, chest w/o contrast  2. Restarted home ropinirole and  sinemet  3. Ordered NS 0.5 L bolus + continuous 250 mL/h  4. Admit to EM Obs ([facility] 6D) for dehydration.  5. PT/OT to evaluate pt's physical deconditioning  6. SW consulted to arrange for additional supportive home services  7. Recommend F/U in outpatient Neurology clinic    I have reviewed the nursing notes.    I have reviewed the findings, diagnosis, plan and need for follow up with the patient.    Please make an appointment to follow up with Neurology Clinic (phone: [phone]) in the next 14 days for updated management of pt's Parkinson's disease.        Final diagnoses:   Dehydration   Falls frequently   Parkinson disease (H)         Bruno Lopez  4/5/2017   [facility], [place], EMERGENCY DEPARTMENT    This data collected with the Resident working in the Emergency Department.  Patient was seen and evaluated by myself and I repeated the history and physical exam with the patient.  The plan of care was discussed with them.  The key portions of the note including the entire assessment and plan reflect my documentation.    MD Dago James Ford Christian, MD  04/09/17 [id number]

## 2017-04-06 ENCOUNTER — APPOINTMENT (OUTPATIENT)
Dept: PHYSICAL THERAPY | Facility: CLINIC | Age: 67
End: 2017-04-06
Attending: NURSE PRACTITIONER
Payer: MEDICARE
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
## 2017-04-06 LAB
ALBUMIN UR-MCNC: NEGATIVE MG/DL
APPEARANCE UR: ABNORMAL
BILIRUB UR QL STRIP: NEGATIVE
COLOR UR AUTO: YELLOW
GLUCOSE UR STRIP-MCNC: NEGATIVE MG/DL
HGB UR QL STRIP: NEGATIVE
INTERPRETATION ECG - MUSE: NORMAL
KETONES UR STRIP-MCNC: NEGATIVE MG/DL
LEUKOCYTE ESTERASE UR QL STRIP: ABNORMAL
MUCOUS THREADS #/AREA URNS LPF: PRESENT /LPF
NITRATE UR QL: NEGATIVE
PH UR STRIP: 6.5 PH (ref 5–7)
RBC #/AREA URNS AUTO: 2 /HPF (ref 0–2)
SP GR UR STRIP: 1.02 (ref 1–1.03)
SQUAMOUS #/AREA URNS AUTO: 5 /HPF (ref 0–1)
TRANS CELLS #/AREA URNS HPF: 1 /HPF (ref 0–1)
URN SPEC COLLECT METH UR: ABNORMAL
UROBILINOGEN UR STRIP-MCNC: NORMAL MG/DL (ref 0–2)
WBC #/AREA URNS AUTO: 27 /HPF (ref 0–2)

## 2017-04-06 PROCEDURE — 96361 HYDRATE IV INFUSION ADD-ON: CPT

## 2017-04-06 PROCEDURE — 81001 URINALYSIS AUTO W/SCOPE: CPT | Performed by: EMERGENCY MEDICINE

## 2017-04-06 PROCEDURE — G0378 HOSPITAL OBSERVATION PER HR: HCPCS

## 2017-04-06 PROCEDURE — A9270 NON-COVERED ITEM OR SERVICE: HCPCS | Mod: GY | Performed by: NURSE PRACTITIONER

## 2017-04-06 PROCEDURE — 25000132 ZZH RX MED GY IP 250 OP 250 PS 637: Mod: GY | Performed by: NURSE PRACTITIONER

## 2017-04-06 PROCEDURE — 97161 PT EVAL LOW COMPLEX 20 MIN: CPT | Mod: GP | Performed by: PHYSICAL THERAPIST

## 2017-04-06 PROCEDURE — A9270 NON-COVERED ITEM OR SERVICE: HCPCS | Mod: GY | Performed by: STUDENT IN AN ORGANIZED HEALTH CARE EDUCATION/TRAINING PROGRAM

## 2017-04-06 PROCEDURE — 99225 ZZC SUBSEQUENT OBSERVATION CARE,LEVEL II: CPT | Mod: Z6 | Performed by: EMERGENCY MEDICINE

## 2017-04-06 PROCEDURE — 25000128 H RX IP 250 OP 636: Performed by: NURSE PRACTITIONER

## 2017-04-06 PROCEDURE — 87086 URINE CULTURE/COLONY COUNT: CPT | Performed by: EMERGENCY MEDICINE

## 2017-04-06 PROCEDURE — 40000193 ZZH STATISTIC PT WARD VISIT: Performed by: PHYSICAL THERAPIST

## 2017-04-06 PROCEDURE — 25000132 ZZH RX MED GY IP 250 OP 250 PS 637: Mod: GY | Performed by: STUDENT IN AN ORGANIZED HEALTH CARE EDUCATION/TRAINING PROGRAM

## 2017-04-06 RX ORDER — CIPROFLOXACIN 250 MG/1
250 TABLET, FILM COATED ORAL EVERY 12 HOURS SCHEDULED
Status: DISCONTINUED | OUTPATIENT
Start: 2017-04-06 | End: 2017-04-07 | Stop reason: HOSPADM

## 2017-04-06 RX ORDER — ROPINIROLE 12 MG/1
12.5 TABLET, FILM COATED, EXTENDED RELEASE ORAL EVERY EVENING
Status: DISCONTINUED | OUTPATIENT
Start: 2017-04-06 | End: 2017-04-06

## 2017-04-06 RX ORDER — ROPINIROLE 12 MG/1
1 TABLET, FILM COATED, EXTENDED RELEASE ORAL EVERY EVENING
Status: DISCONTINUED | OUTPATIENT
Start: 2017-04-06 | End: 2017-04-06

## 2017-04-06 RX ORDER — SODIUM CHLORIDE 9 MG/ML
INJECTION, SOLUTION INTRAVENOUS CONTINUOUS
Status: DISCONTINUED | OUTPATIENT
Start: 2017-04-06 | End: 2017-04-07

## 2017-04-06 RX ORDER — CARBIDOPA AND LEVODOPA 25; 100 MG/1; MG/1
2 TABLET ORAL
Status: DISCONTINUED | OUTPATIENT
Start: 2017-04-06 | End: 2017-04-06

## 2017-04-06 RX ORDER — ROPINIROLE 2 MG/1
4 TABLET, FILM COATED ORAL 3 TIMES DAILY
Status: DISCONTINUED | OUTPATIENT
Start: 2017-04-07 | End: 2017-04-07 | Stop reason: HOSPADM

## 2017-04-06 RX ORDER — CHLORAL HYDRATE 500 MG
1 CAPSULE ORAL DAILY
Status: DISCONTINUED | OUTPATIENT
Start: 2017-04-06 | End: 2017-04-07 | Stop reason: HOSPADM

## 2017-04-06 RX ORDER — ACETAMINOPHEN 325 MG/1
650 TABLET ORAL EVERY 4 HOURS PRN
Status: DISCONTINUED | OUTPATIENT
Start: 2017-04-06 | End: 2017-04-07 | Stop reason: HOSPADM

## 2017-04-06 RX ORDER — NALOXONE HYDROCHLORIDE 0.4 MG/ML
.1-.4 INJECTION, SOLUTION INTRAMUSCULAR; INTRAVENOUS; SUBCUTANEOUS
Status: DISCONTINUED | OUTPATIENT
Start: 2017-04-06 | End: 2017-04-07 | Stop reason: HOSPADM

## 2017-04-06 RX ORDER — CARBIDOPA AND LEVODOPA 25; 100 MG/1; MG/1
2 TABLET, ORALLY DISINTEGRATING ORAL ONCE
Status: DISCONTINUED | OUTPATIENT
Start: 2017-04-06 | End: 2017-04-06

## 2017-04-06 RX ORDER — CARBIDOPA AND LEVODOPA 25; 100 MG/1; MG/1
2 TABLET ORAL ONCE
Status: DISCONTINUED | OUTPATIENT
Start: 2017-04-06 | End: 2017-04-06

## 2017-04-06 RX ORDER — NAPROXEN SODIUM 220 MG
220 TABLET ORAL 2 TIMES DAILY PRN
Status: DISCONTINUED | OUTPATIENT
Start: 2017-04-06 | End: 2017-04-07 | Stop reason: HOSPADM

## 2017-04-06 RX ADMIN — CARBIDOPA AND LEVODOPA 2 TABLET: 25; 100 TABLET ORAL at 23:51

## 2017-04-06 RX ADMIN — CIPROFLOXACIN HYDROCHLORIDE 250 MG: 250 TABLET, FILM COATED ORAL at 19:51

## 2017-04-06 RX ADMIN — CIPROFLOXACIN HYDROCHLORIDE 250 MG: 250 TABLET, FILM COATED ORAL at 09:10

## 2017-04-06 RX ADMIN — CARBIDOPA AND LEVODOPA 2 TABLET: 25; 100 TABLET ORAL at 19:51

## 2017-04-06 RX ADMIN — Medication 1 G: at 08:14

## 2017-04-06 RX ADMIN — SODIUM CHLORIDE: 9 INJECTION, SOLUTION INTRAVENOUS at 03:06

## 2017-04-06 RX ADMIN — SODIUM CHLORIDE: 9 INJECTION, SOLUTION INTRAVENOUS at 15:33

## 2017-04-06 RX ADMIN — CARBIDOPA AND LEVODOPA 2 TABLET: 25; 100 TABLET ORAL at 08:14

## 2017-04-06 RX ADMIN — CARBIDOPA AND LEVODOPA 2 TABLET: 25; 100 TABLET ORAL at 01:11

## 2017-04-06 RX ADMIN — CARBIDOPA AND LEVODOPA 2 TABLET: 25; 100 TABLET ORAL at 13:01

## 2017-04-06 RX ADMIN — CARBIDOPA AND LEVODOPA 2 TABLET: 25; 100 TABLET ORAL at 16:46

## 2017-04-06 NOTE — PROGRESS NOTES
04/06/17 1420   Quick Adds   Quick Adds Certification   Type of Visit Initial PT Evaluation       Present no   Language english   Living Environment   Lives With spouse   Living Arrangements house   Home Accessibility stairs to enter home;stairs (2 railings present)   Number of Stairs to Enter Home 5  (B rails)   Number of Stairs Within Home 0  (all needs met on main level)   Transportation Available car;family or friend will provide   Living Environment Comment Pt lives with  who is home and has been previously able to assist pt as needed.   Self-Care   Usual Activity Tolerance moderate   Current Activity Tolerance poor   Regular Exercise no   Equipment Currently Used at Home walker, rolling;wheelchair   Activity/Exercise/Self-Care Comment Pt reports using fww in the home for short distance ambulation. Use of w/c for longer distances outside the home. Reports assist level depends on the day d/t hx of Parkinson's. Some days able to complete mobility without assist - other days she needs more help from either  or her children if they are around.    Functional Level Prior   Ambulation 1-->assistive equipment   Transferring 1-->assistive equipment   Fall history within last six months yes   Number of times patient has fallen within last six months (many - 3 day of admission + others prior to that)   Which of the above functional risks had a recent onset or change? ambulation;transferring;fall history   Prior Functional Level Comment PLOF varies from Mod I to Ax1 depending on day and how pt is feeling d/t Parkinsons   General Information   Onset of Illness/Injury or Date of Surgery - Date 04/05/17   Referring Physician Darlene Walker CNP   Patient/Family Goals Statement None stated   Pertinent History of Current Problem (include personal factors and/or comorbidities that impact the POC) 66 year old female with Hx Parkinson's disease, hypothyroidism, physical deconditioning -  admitted following frequent falls at home (pt had at least 3 on day of admission)   Precautions/Limitations fall precautions   General Observations Pt supine in bed upon arrival. Soft spoken. Agreeable to therapy session.    General Info Comments Activity Orders: Ambulate with assist.    Cognitive Status Examination   Orientation orientation to person, place and time   Level of Consciousness alert   Follows Commands and Answers Questions 100% of the time   Personal Safety and Judgment intact   Memory intact   Pain Assessment   Patient Currently in Pain No   Posture    Posture Forward head position;Protracted shoulders;Kyphosis   Range of Motion (ROM)   ROM Comment B LE WFL. Did not increased stiffness in R ankle - only able to achieve neutral foot positioning.    Strength   Strength Comments Demonstrates at least 3/5 B LE strength with functional mobility and MMT screen while seated EOB.    Bed Mobility   Bed Mobility Comments Supine>sitting EOB with Min-ModA of 1 and HOB elevated. Effortful for pt, needing assist to scoot fully to EOB. Sit>supine with ModA of 1 to bring B LE back into bed. Ax2 to boost up in bed.    Transfer Skills   Transfer Comments Sit<>stand from elevated EOB with Mod-MaxA of 1 and fww. Pt able to clear bottom from EOB, but relies on B LE leaning on bed to maintain upright posture - very weak LE. Pt unable to maintain standing longer than ~30 sec before needing to sit down. Heavy use of UE on fww to maintain upright posture d/t weakness.    Gait   Gait Comments NT - Not safe to initiate today d/t weakness and poor standing tolerance.   Balance   Balance Comments Seated: good - CGA for safety. Standing: poor - requires MaxA to maintain upright posture with use of fww. Very weak LE.    Sensory Examination   Sensory Perception Comments Pt denies any numbness or tingling in B LE.    General Therapy Interventions   Planned Therapy Interventions balance training;bed mobility training;gait  "training;neuromuscular re-education;ROM;strengthening;stretching;transfer training;home program guidelines;progressive activity/exercise   Clinical Impression   Criteria for Skilled Therapeutic Intervention evaluation only   PT Diagnosis Decreased functional mobility   Influenced by the following impairments Decreased strength and activity tolerance, impaired balance   Functional limitations due to impairments Impaired ability to functionally navigate in home or community   Clinical Presentation Stable/Uncomplicated   Clinical Presentation Rationale PMH   Clinical Decision Making (Complexity) Low complexity   Therapy Frequency` (1x eval only for safe disposition)   Predicted Duration of Therapy Intervention (days/wks) (1x eval only for safe disposition)   Anticipated Equipment Needs at Discharge (pt has fww and w/c at home)   Anticipated Discharge Disposition Transitional Care Facility   Risk & Benefits of therapy have been explained Yes   Patient, Family & other staff in agreement with plan of care Yes   Clinical Impression Comments Pt presents with significant weakness and currently not safe to return to home. Currently recommend discharge to TCU to progress functional strengthening and safety and IND with mobility.   Therapy Certification   Start of care date 04/06/17   Certification date from 04/06/17   Certification date to 04/07/17   Medical Diagnosis falls at home, hx of Parkinsons   [facility]-PAC TM \"6 Clicks\"   2016, Trustees of [facility], under license to [employer].  All rights reserved.   6 Clicks Short Forms Basic Mobility Inpatient Short Form   [facility] AM-PAC  \"6 Clicks\" V.2 Basic Mobility Inpatient Short Form   1. Turning from your back to your side while in a flat bed without using bedrails? 3 - A Little   2. Moving from lying on your back to sitting on the side of a flat bed without using bedrails? 3 - A Little   3. Moving to and from a bed to a chair (including a " wheelchair)? 2 - A Lot   4. Standing up from a chair using your arms (e.g., wheelchair, or bedside chair)? 2 - A Lot   5. To walk in hospital room? 1 - Total   6. Climbing 3-5 steps with a railing? 1 - Total   Basic Mobility Raw Score (Score out of 24.Lower scores equate to lower levels of function) 12   Total Evaluation Time   Total Evaluation Time (Minutes) 20

## 2017-04-06 NOTE — PROGRESS NOTES
12:25 PM The patient was seen and examined by me and the case was discussed with the JOANN. We are in agreement with the assessment and plan.  66-year-old female living at home with her  who is in his 80s.  She has Parkinson's and struggles with ambulation she uses a wheelchair and a walker.  She currently has no assistance at home.  She fell 2 or 3 times yesterday and was evaluated in the emergency department with an extensive workup that showed mild dehydration however CT of the head and neck and chest were negative.  She was admitted to the observation unit for rehydration and PT OT consult with question of home health care versus a transfer to TCU or assisted living.    Physical exam:  General: Awake, alert, frail and chronically ill-appearing  Cardiac: Regular rate and rhythm grade 2 systolic murmur  Respiratory: Lungs clear  Abdomen: Soft nontender  Neurologic: Awake alert oriented, mild muscular rigidity no tremor.  Psychiatric: Flat depressed affect oriented ×3    Assessment and plan: 66-year-old female with frequent falls likely from Parkinson's and general deconditioning.  Living independently with  who is much older.  I do not see how she can safely be discharged back to home.  Appreciate PT OT and social work consults.  I suspect that she will fail this assessment and the recommendation will be for placement.  If somehow she is able to be safely discharged home, we would proceed in that direction however, I doubt this will be the finding in which case the options will be explained to the patient.  I suspect placement today may be unlikely.  I don't see a medical issue that should keep her on the Observation Unit beyond today.

## 2017-04-06 NOTE — PLAN OF CARE
Problem: Discharge Planning  Goal: Discharge Planning (Adult, OB, Behavioral, Peds)  Taking adequate food and fluids? No. Patient has orders for iv fluids  - safe disposition is determined?. No  -Discharge planning is complete? No  -PT and OT evaluation complete? No. To be done in am.  Patient alert. Denies pain. Dx: fall and parkinsons. Received sinemet. On bedrest. VS wnl.

## 2017-04-06 NOTE — PROGRESS NOTES
Social Work Services Progress Note    Hospital Day: 1  Date of Initial Social Work Evaluation:  Not able to complete  Collaborated with:  NP, PT and pt    Data:  SW updated by PT that recommendation will be TCU. Writer reviewed chart and met w/ pt to explain TCU recommendation and d/c planning. Pt does not have TCU coverage, due to Obs status.     Intervention:  D/C Planning    Assessment:  Pt is tearful and upset that she does not have coverage for a TCU. She wants to discuss with her  and meet w/ writer tomorrow. Pt reports she has been to [facility] and [facility] in the past.     Plan:    Anticipated Disposition:  TCU vs home, per pt choice    Barriers to d/c plan:  Insurance/Financial    Follow Up:  SW to f/u in the AM to further discuss TCU.

## 2017-04-06 NOTE — ED NOTES
Calling 6D for admit nurse.  Talked to charge 6D, will not be able to take patient for an hours, by JO Aguilera.

## 2017-04-06 NOTE — H&P
Admission Date: 04/05/17  Attending Physician: Dr. Paras Gold  Primary Care Physician: Joseph Alcantara\  NP/PA: RAJ Avery, CNP    REASON FOR ADMISSION:     Chief Complaint   Patient presents with     Fall     slid off the toilet today, family feels they are no longer able to care for patient at home     History of Present Illness, per ER MD: Brenda Clancy is a 66 year old female with Hx Parkinson's disease, hypothyroidism, physical deconditioning, BIBA p/w falling with imbalance. Pt states that she was resting on the toilet when she slid forward off the toilet and struck her forehead. Pt states that she didn't hurt herself, no LoC. No HA, change in vision, no dizziness/vertigo, no spells. Pt was too weak to lift herself back off the floor. She was on the floor for about 5 minutes before her  heard her and came to rescue her. He was too weak to lift her so EMS was called. EMS reports that the pt has fallen at least 3 times today, and pt endorses frequent daily falls. Pt ambulates at home with a walker.     Pt has lived in some sort of facility (LTF or OT Rehab) in the past, but has lived at home for the past 1+ year with family. Family would like for pt to be stabilized and then return home. Family is seeking additional home health care to assist pt w/ morning ADLs.     Pt denies HA, fever, chills, cough, difficulty breathing, palpitations or chest pain, nausea, vomiting, diarrhea, constipation, sudden weakness, MSK or joint pains, vertigo. No recent illnesses or recent medication changes.     ED Course: See full lab detail, CBC normal, CMP WNL but suggestive of mild dehydration. Positive UA for UTI. Afebrile with VSS.   EKG: Junctional rhythm rate of 57, no ST changes, ? Old anterior infarct   CXR:         IMPRESSION: There is patchy opacification of the left lung base. The  right lung is clear. Cannot exclude a small left effusion. No  pneumothorax.     XR Pelvis:  "\"No evidence of acute fracture or malalignment\"  Chest CT: IMPRESSION: \"Unremarkable unenhanced CT of the chest. Specifically, the lungs are clear\". Please see full report for further details.   Head CT: IMPRESSION:   1. No acute abnormality.  2. Atrophy of the brain. White matter changes consistent with sequelae of small vessel ischemic disease. This is unchanged.  CT cervical spine: IMPRESSION:  1. Degenerative changes.  2. No evidence of acute trauma.  3. Motion artifacts limit detail for small fractures.    Observation Unit Arrival: 2238 arrived via gurney. Placed in bed, used bedpan. Had Sinemet in ER. Needs midnight dose.     REVIEW OF SYSTEMS:  Constitutional: Positive for fatigue. Negative for chills, diaphoresis and fever.   HENT: Negative for congestion, facial swelling, hearing loss and sore throat.   Eyes: Negative for visual disturbance.   Respiratory: Negative for apnea, cough, chest tightness and shortness of breath.   Cardiovascular: Negative for chest pain.   Gastrointestinal: Negative for abdominal pain, constipation, diarrhea, nausea and vomiting.   Genitourinary: Negative for difficulty urinating.   Musculoskeletal: Negative for arthralgias and neck stiffness.   Skin: Negative for color change.   Neurological: Positive for tremors and weakness. Negative for dizziness, seizures, syncope, numbness and headaches.   Psychiatric/Behavioral: Negative for confusion.    Unchanged from ER and as below from ER note.   PAST MEDICAL HISTORY:  Past Medical History:   Diagnosis Date     Bunion 4/11/00    RIGHT FIRST TOE     Cyst of Bartholin's gland     COLPOSCOPY PER PT -EARLY '80'S - WAS OKAY     Delirium 10/27/2013     HYPOTHYROIDISM      Paralysis agitans (H) 3/13/01    PARKINSON 'S DZ - DR CRAWFORD,NEUROLOGY     Symptomatic menopausal or female climacteric states 1/29/01       PAST SURGICAL HISTORY:  Past Surgical History:   Procedure Laterality Date     C NONSPECIFIC PROCEDURE      S/P EXCISED " GANGLION,PALM LEFT HAND     C NONSPECIFIC PROCEDURE  4/13/00    [facility] BUNIONECTOMY RIGHT FIRST TOE     SOCIAL HISTORY:  Social History   Substance Use Topics     Smoking status: Former Smoker     Quit date: 4/8/1970     Smokeless tobacco: Never Used     Alcohol use No     FAMILY HISTORY:  Family History   Problem Relation Age of Onset     CANCER Mother         UTERINE     DIABETES Mother         ADULT ONSET     Cancer - colorectal Father       DIABETES Father         ADULT ONSET     Breast Cancer           ALLERGIES:   No Known Allergies  Allergies   Allergen Reactions     No Known Drug Allergies      MEDICATIONS:    No current facility-administered medications on file prior to encounter.   Current Outpatient Prescriptions on File Prior to Encounter:  naproxen sodium (ALEVE) 220 MG capsule Take 220 mg by mouth 2 times daily as needed   calcium carb 1250 mg, 500 mg [place],/vitamin D 200 units (OSCAL WITH D) 500-200 MG-UNIT per tablet Take 1 tablet by mouth 2 times daily (with meals)   [DISCONTINUED] carbidopa-levodopa (SINEMET)  MG per tablet Take 1 tablet by mouth 6 times daily (Patient taking differently: Take 2 tablets by mouth 5 times daily )       PHYSICAL EXAM:   95/55, T: 97.4, P: 66, R: 16    All vital signs were reviewed.  GENERAL APPEARENCE: Pleasant, generally appears de-conditioned, Alert, oriented for events, place but tired and wanting to sleep. NAD.  SKIN: Clean, dry, and intact without visible lesions, rash, jaundice, cyanosis, erythema. ecchymoses of left knee without swelling.  HEENT: NCAT w/out masses, lesions, or abnormalities. Sclera anicteric,   NECK: Supple, no masses. No jugular venous distention. No pain  CARDIOVASCULAR: S1, S2 RRR. Faint murmur, no rubs, or gallops.   RESPIRATORY: Respiratory effort WNL. CTA  bilaterally without crackles/rales/wheeze   GI:  BS subtle in all 4 quadrants. Abdomen soft and non-tender. No masses or hepatosplenomegaly.  : Deferred, UA shows  UTI  MUSCULOSKELETAL: Gait is not observed. Strength in major muscle groups of bilateral UE and LE limited by Parkinson's and deconditioning.  Extremities non-tender to palpation. Left knee bruised but not painful subjectively. Some mild tremor at end of Sinemet dosing interval noted.   PV: 2+ bilateral radial and pedal pulses. No edema noted.   NEURO: Speech halting. Appropriate throughout interview.   Sensation grossly WNL. Finger to nose and rapid alternating movements not possible due to Parkinsons. Patient has rigidity and poor coordination of extremities.   PSYCH: Flat affect with halting speech  HEME/LYMPH: No visible bleeding. No palpable mandibular/cervical/supra/infraclavicular lymph nodes  PSYCHIATRIC: Mentation and affect appear normal  VASCULAR ACCESS: CDI without erythema or discharge. Non-tender.      DATA :  Results for orders placed or performed during the hospital encounter of 04/05/17 (from the past 24 hour(s))   EKG 12-lead, tracing only   Result Value Ref Range    Interpretation ECG Click View Image link to view waveform and result    Chest  XR, 1 view portable    Narrative    XR CHEST PORT 1 VW 4/5/2017 3:50 PM    HISTORY: Trauma.    COMPARISON: October 27, 2013.      Impression    IMPRESSION: There is patchy opacification of the left lung base. The  right lung is clear. Cannot exclude a small left effusion. No  pneumothorax.    ANGUS FERNÁNDEZ MD   XR Pelvis Port 1/2 Views    Narrative    XR PELVIS PORT 1/2 VW 4/5/2017 3:52 PM    HISTORY: Trauma.    COMPARISON: September 10, 2013.      Impression    IMPRESSION: No evidence of acute fracture or malalignment.    ANGUS FERNÁNDEZ MD   CBC with platelets differential   Result Value Ref Range    WBC 7.3 4.0 - 11.0 10e9/L    RBC Count 4.68 3.8 - 5.2 10e12/L    Hemoglobin 14.3 11.7 - 15.7 g/dL    Hematocrit 43.1 35.0 - 47.0 %    MCV 92 78 - 100 fl    MCH 30.6 26.5 - 33.0 pg    MCHC 33.2 31.5 - 36.5 g/dL    RDW 12.9 10.0 - 15.0 %    Platelet Count 217 150  - 450 10e9/L    Diff Method Automated Method     % Neutrophils 68.7 %    % Lymphocytes 22.3 %    % Monocytes 5.5 %    % Eosinophils 3.3 %    % Basophils 0.1 %    % Immature Granulocytes 0.1 %    Nucleated RBCs 0 0 /100    Absolute Neutrophil 5.0 1.6 - 8.3 10e9/L    Absolute Lymphocytes 1.6 0.8 - 5.3 10e9/L    Absolute Monocytes 0.4 0.0 - 1.3 10e9/L    Absolute Eosinophils 0.2 0.0 - 0.7 10e9/L    Absolute Basophils 0.0 0.0 - 0.2 10e9/L    Abs Immature Granulocytes 0.0 0 - 0.4 10e9/L    Absolute Nucleated RBC 0.0    Comprehensive metabolic panel   Result Value Ref Range    Sodium 145 (H) 133 - 144 mmol/L    Potassium 4.2 3.4 - 5.3 mmol/L    Chloride 110 (H) 94 - 109 mmol/L    Carbon Dioxide 29 20 - 32 mmol/L    Anion Gap 6 3 - 14 mmol/L    Glucose 88 70 - 99 mg/dL    Urea Nitrogen 33 (H) 7 - 30 mg/dL    Creatinine 0.78 0.52 - 1.04 mg/dL    GFR Estimate 74 >60 mL/min/1.7m2    GFR Estimate If Black 90 >60 mL/min/1.7m2    Calcium 9.7 8.5 - 10.1 mg/dL    Bilirubin Total 0.6 0.2 - 1.3 mg/dL    Albumin 4.0 3.4 - 5.0 g/dL    Protein Total 7.3 6.8 - 8.8 g/dL    Alkaline Phosphatase 126 40 - 150 U/L    ALT 8 0 - 50 U/L    AST 22 0 - 45 U/L   Magnesium   Result Value Ref Range    Magnesium 2.4 (H) 1.6 - 2.3 mg/dL   Phosphorus   Result Value Ref Range    Phosphorus 3.2 2.5 - 4.5 mg/dL   TSH   Result Value Ref Range    TSH 3.89 0.40 - 4.00 mU/L   Head CT w/o contrast    Narrative    CT SCAN OF THE HEAD WITHOUT CONTRAST   4/5/2017 6:02 PM     HISTORY: Trauma. Parkinsonism.     TECHNIQUE:  Axial images of the head and coronal reformations without  IV contrast material. Radiation dose for this scan was reduced using  automated exposure control, adjustment of the mA and/or kV according  to patient size, or iterative reconstruction technique.    COMPARISON: 10/27/2013.    FINDINGS:  There is generalized atrophy of the brain.  There is low  attenuation in the white matter of the cerebral hemispheres consistent  with sequelae of small  vessel ischemic disease. There is no evidence  of intracranial hemorrhage, mass, acute infarct or anomaly.     The visualized portions of the sinuses and mastoids appear normal.  There is no evidence of trauma.      Impression    IMPRESSION:   1. No acute abnormality.  2. Atrophy of the brain.  White matter changes consistent with  sequelae of small vessel ischemic disease. This is unchanged.   Cervical spine CT w/o contrast    Narrative    CT CERVICAL SPINE WITHOUT CONTRAST   4/5/2017 6:02 PM     HISTORY: Trauma. Parkinson's disease.     TECHNIQUE: Axial images of the cervical spine were obtained without  intravenous contrast. Multiplanar reformations were performed.  Radiation dose for this scan was reduced using automated exposure  control, adjustment of the mA and/or kV according to patient size, or  iterative reconstruction technique.    COMPARISON: None.    FINDINGS: There is no evidence of fracture. Motion artifacts limit  detail, however. There is multilevel degenerative disc disease  especially at C5-C6 and C6-C7. There is multilevel degenerative facet  arthropathy bilaterally. There is grade 1 subluxation of C4 on C5.  There is no paraspinous soft tissue swelling. Spinal canal is widely  patent.      Impression    IMPRESSION:  1. Degenerative changes.  2. No evidence of acute trauma.  3. Motion artifacts limit detail for small fractures.   Chest CT w/o contrast    Narrative    CT CHEST WITHOUT CONTRAST  4/5/2017 6:03 PM    HISTORY: Left-sided opacity on a recent radiograph.     COMPARISON: A chest radiograph earlier today.    TECHNIQUE: Routine transverse CT imaging of the chest was performed  without contrast. Radiation dose for this scan was reduced using  automated exposure control, adjustment of the mA and/or kV according  to patient size, or iterative reconstruction technique.    FINDINGS: The heart size is normal. No enlarged lymph node or other  abnormal mediastinal mass is seen. The thoracic aorta  and pulmonary  arteries are unremarkable, allowing for the absence of contrast. The  lungs are clear. No pneumothorax is demonstrated. No pleural effusion  is identified. There appear to be old left posterior rib fractures. No  acute fracture or other osseous abnormality is seen. No chest wall  pathology is seen. The visualized upper abdomen is unremarkable.      Impression    IMPRESSION: Unremarkable unenhanced CT of the chest. Specifically, the  lungs are clear.    JEREMY HUIZAR MD       ASSESSMENT/PLAN: Brenda Clancy is a 66 year old female with Hx Parkinson's disease, hypothyroidism, physical deconditioning, BIBA p/w falling with imbalance. Fall from toilet and struck her head tonight but without injury or positive LOC. She was too weak to get herself up.Patient is cared for by her elderly . Family needing assistance with discharge options.      #. Falls: History of Parkinson's. Unsteady gait and uses a walker with assist. Does not ambulate on her own. Also has a w/c. Patient is generally deconditioned. Fell two or three times today. Possibly having more unreported falls? No injury, negative trauma workup in the ER including negative head CT.   - SW and discharge planning to address patients safe discharge    #. Dehydration:  CMP suggestive of mild dehydration (sodium and chloride elevated- BUN 33, Mag sl elevated), likely secondary to poor oral intake and perhaps lack of frequent access to consume adequate fluids (due to mobility limitations).   -Gentle IV hydration with NS at 100 cc's per hour  - Monitor I&O's  -Push oral fluids as tolerated.     #. UTI: UA showing UTI. Patient not having sx's. Afebrile. Normal CBC.   -Oral Cipro 250 mg every 12 hours      FEN:  -Regular diet as tolerated.  -Monitor BMP and replace electrolytes per protocol    Prophy:  -No VTE prophy as patient is up ad valorie and anticipate short observation stay   -Encourage ambulation as tolerated   -for GI prophy  -PRN  Senna and Miralax    Consults: SW and discharge planning with patient and family for more assistance at home.     CODE STATUS:  FULL CODE   (patient states she may papers at home, POLST).     DISPOSITION: Family would like to take patient home again and have in home health care services set up for her. She will need discharge planning as this seems an unlikely scenario.     RAJ Avery, CNP  Emergency Department Observation Unit    1910- Received call from Dr. Vazquez. He has not seen the patient but is calling on behalf of Dr. Lopez who signed out report to him and a Resident. Patient has had full workup and is at [place]. Plan will be to admit for rehydration, with PT and OT evaluation in the AM. Will need discharge planning for increased home care services to facilitate remaining in her current living situation.     2238 Patient arrived to unit.     0700 Patient has UTI and will be treated with oral antibiotic

## 2017-04-06 NOTE — PROGRESS NOTES
[facility] [place]  Daily Progress Note          Assessment & Plan:   Brenda Clancy is a 66 year old female with Hx Parkinson's disease, hypothyroidism, physical deconditioning, BIBA p/w falling with imbalance.    1. Falls, generalized weakness:  Imaging: Ct c-spine, CT head, CT chest, XR pelvis, chest x-ray negative for any acute abnormalities or fractures.  Increased frequency of falls recently.  PT consulted today and recommended TCU at d/c. UA concerning for UTI, although patient asymptomatic.    Social work aware.   -Social work consult  -falls precautions  -treat UTI    2. UTI:  UA with large LE, 27 WBC, nitrite negative, asymptomatic.    -cipro 250 mg po BID X 3 days  -follow urine culture  -NS at 75 ml/hour      FEN: Regular  Lines: PIV  Prophylaxis: anticipate short stay          Consults:   none         Discharge Planning:   TCU, likely tomorrow        Interval History:   Brenda is doing ok this am.  Admits to feeling more weak at home, agreeable to learning more about TCU.  Denies any pain or other complaints.      ROS:   Constitutional: No fevers/chills. Tolerating diet.   Cardiovascular: No chest pain or palpitations.   Respiratory: No cough or SOB.   GI: No abdominal pain. No N/V.      : No urinary complaints.   Musculoskeletal: Denies pain.           Physical Exam:   /72 (BP Location: Left arm)  Pulse 65  Temp 98  F (36.7  C) (Oral)  Resp 16  Wt 53.1 kg (117 lb)  LMP 07/08/2003  SpO2 97%  BMI 22.11 kg/m2     GENERAL: Alert and oriented x 3. NAD.   HEENT: Anicteric sclera. Mucous membranes moist.   CV: RRR. S1, S2. No murmurs appreciated.   RESPIRATORY: Effort normal. Lungs CTAB with no wheezing, rales, rhonchi.   GI: Abdomen soft and non distended with normoactive bowel sounds present in all quadrants. No tenderness, rebound, guarding.   NEUROLOGICAL: No focal deficits. Moves all extremities.    EXTREMITIES: No peripheral edema. Intact  bilateral pedal pulses.   SKIN: No jaundice. No rashes.     Medication list reviewed.   Labs and imaging were reviewed.     RAJ Kapoor, CNP  Pager: [phone]

## 2017-04-06 NOTE — PLAN OF CARE
Problem: Discharge Planning  Goal: Discharge Planning (Adult, OB, Behavioral, Peds)  Outpatient/Observation goals to be met before discharge home:     -Taking adequate food and fluids? Patient eating small meals for lunch and breakfast. Patient tolerated PO intake well. Patient has orders for iv fluids  - safe disposition is determined?. No  -Discharge planning is complete? No  -PT and OT evaluation complete? No. PT consult completed.   /69 (BP Location: Left arm)  Pulse 67  Temp 98.2  F (36.8  C) (Oral)  Resp 16  Wt 53.1 kg (117 lb)  LMP 07/08/2003  SpO2 97%  BMI 22.11 kg/m2

## 2017-04-06 NOTE — PLAN OF CARE
Problem: Discharge Planning  Goal: Discharge Planning (Adult, OB, Behavioral, Peds)  Outpatient/Observation goals to be met before discharge home:     -Taking adequate food and fluids? Patient tolerated breakfast well. Patient has orders for iv fluids  - safe disposition is determined?. No  -Discharge planning is complete? No  -PT and OT evaluation complete? No. To be done in am.  Patient alert. Denies pain. Dx: fall and parkinsons. Received sinemet. On bedrest. VS wnl.

## 2017-04-06 NOTE — PLAN OF CARE
Problem: Goal Outcome Summary  Goal: Goal Outcome Summary  PT 6D: Evaluation completed. Pt completes bed mobility with Min-ModA of 1. Attempted standing at EOB. Pt completes sit<>stand with ModA of 1 and fww, but relies heavily on fww and LE leaning on bed for support. LE very weak. Unable to tolerate standing >30 sec and not appropriate for gait or pivot transfers this date. At baseline, pt reports she could ambulate in home with fww as she needed with occasional assist from /children if needed.     Recommend discharge to TCU to progress safety and IND with functional mobility prior to return home.

## 2017-04-07 VITALS
BODY MASS INDEX: 22.11 KG/M2 | DIASTOLIC BLOOD PRESSURE: 79 MMHG | TEMPERATURE: 97.9 F | SYSTOLIC BLOOD PRESSURE: 122 MMHG | RESPIRATION RATE: 16 BRPM | WEIGHT: 117 LBS | OXYGEN SATURATION: 96 % | HEART RATE: 63 BPM

## 2017-04-07 LAB
ANION GAP SERPL CALCULATED.3IONS-SCNC: 8 MMOL/L (ref 3–14)
BACTERIA SPEC CULT: NORMAL
BUN SERPL-MCNC: 18 MG/DL (ref 7–30)
CALCIUM SERPL-MCNC: 8.7 MG/DL (ref 8.5–10.1)
CHLORIDE SERPL-SCNC: 110 MMOL/L (ref 94–109)
CO2 SERPL-SCNC: 26 MMOL/L (ref 20–32)
CREAT SERPL-MCNC: 0.82 MG/DL (ref 0.52–1.04)
GFR SERPL CREATININE-BSD FRML MDRD: 69 ML/MIN/1.7M2
GLUCOSE SERPL-MCNC: 97 MG/DL (ref 70–99)
Lab: NORMAL
MICRO REPORT STATUS: NORMAL
POTASSIUM SERPL-SCNC: 4 MMOL/L (ref 3.4–5.3)
SODIUM SERPL-SCNC: 144 MMOL/L (ref 133–144)
SPECIMEN SOURCE: NORMAL

## 2017-04-07 PROCEDURE — 96361 HYDRATE IV INFUSION ADD-ON: CPT

## 2017-04-07 PROCEDURE — 36415 COLL VENOUS BLD VENIPUNCTURE: CPT | Performed by: PHYSICIAN ASSISTANT

## 2017-04-07 PROCEDURE — A9270 NON-COVERED ITEM OR SERVICE: HCPCS | Mod: GY | Performed by: STUDENT IN AN ORGANIZED HEALTH CARE EDUCATION/TRAINING PROGRAM

## 2017-04-07 PROCEDURE — 99217 ZZC OBSERVATION CARE DISCHARGE: CPT | Mod: Z6 | Performed by: EMERGENCY MEDICINE

## 2017-04-07 PROCEDURE — 25000132 ZZH RX MED GY IP 250 OP 250 PS 637: Mod: GY | Performed by: NURSE PRACTITIONER

## 2017-04-07 PROCEDURE — A9270 NON-COVERED ITEM OR SERVICE: HCPCS | Mod: GY | Performed by: NURSE PRACTITIONER

## 2017-04-07 PROCEDURE — 25000128 H RX IP 250 OP 636: Performed by: NURSE PRACTITIONER

## 2017-04-07 PROCEDURE — 25000132 ZZH RX MED GY IP 250 OP 250 PS 637: Mod: GY | Performed by: PHYSICIAN ASSISTANT

## 2017-04-07 PROCEDURE — 80048 BASIC METABOLIC PNL TOTAL CA: CPT | Performed by: PHYSICIAN ASSISTANT

## 2017-04-07 PROCEDURE — G0378 HOSPITAL OBSERVATION PER HR: HCPCS

## 2017-04-07 PROCEDURE — A9270 NON-COVERED ITEM OR SERVICE: HCPCS | Mod: GY | Performed by: PHYSICIAN ASSISTANT

## 2017-04-07 PROCEDURE — 25800025 ZZH RX 258: Performed by: NURSE PRACTITIONER

## 2017-04-07 PROCEDURE — 25000132 ZZH RX MED GY IP 250 OP 250 PS 637: Mod: GY | Performed by: STUDENT IN AN ORGANIZED HEALTH CARE EDUCATION/TRAINING PROGRAM

## 2017-04-07 RX ORDER — SODIUM CHLORIDE, SODIUM LACTATE, POTASSIUM CHLORIDE, CALCIUM CHLORIDE 600; 310; 30; 20 MG/100ML; MG/100ML; MG/100ML; MG/100ML
INJECTION, SOLUTION INTRAVENOUS CONTINUOUS
Status: DISCONTINUED | OUTPATIENT
Start: 2017-04-07 | End: 2017-04-07

## 2017-04-07 RX ORDER — CIPROFLOXACIN 250 MG/1
250 TABLET, FILM COATED ORAL EVERY 12 HOURS
Refills: 0 | DISCHARGE
Start: 2017-04-07 | End: 2017-04-09

## 2017-04-07 RX ADMIN — CIPROFLOXACIN HYDROCHLORIDE 250 MG: 250 TABLET, FILM COATED ORAL at 08:56

## 2017-04-07 RX ADMIN — CARBIDOPA AND LEVODOPA 2 TABLET: 25; 100 TABLET ORAL at 08:55

## 2017-04-07 RX ADMIN — ROPINIROLE 4 MG: 2 TABLET, FILM COATED ORAL at 08:55

## 2017-04-07 RX ADMIN — SODIUM CHLORIDE, POTASSIUM CHLORIDE, SODIUM LACTATE AND CALCIUM CHLORIDE: 600; 310; 30; 20 INJECTION, SOLUTION INTRAVENOUS at 10:50

## 2017-04-07 RX ADMIN — CARBIDOPA AND LEVODOPA 2 TABLET: 25; 100 TABLET ORAL at 13:14

## 2017-04-07 RX ADMIN — CARBIDOPA AND LEVODOPA 2 TABLET: 25; 100 TABLET ORAL at 18:05

## 2017-04-07 RX ADMIN — SODIUM CHLORIDE: 9 INJECTION, SOLUTION INTRAVENOUS at 05:21

## 2017-04-07 RX ADMIN — ROPINIROLE 4 MG: 2 TABLET, FILM COATED ORAL at 14:13

## 2017-04-07 RX ADMIN — ROPINIROLE 4 MG: 2 TABLET, FILM COATED ORAL at 00:11

## 2017-04-07 NOTE — PLAN OF CARE
Problem: Discharge Planning  Goal: Discharge Planning (Adult, OB, Behavioral, Peds)  -Taking adequate food and fluids? Yes, Patient tolerating PO intake well. Patient also has orders for iv fluids  - safe disposition is determined?. No  -Discharge planning is complete? No  -PT and OT evaluation complete? No. PT consult completed.

## 2017-04-07 NOTE — PLAN OF CARE
Problem: Discharge Planning  Goal: Discharge Planning (Adult, OB, Behavioral, Peds)  Outpatient/Observation goals to be met before discharge home:     -Taking adequate food and fluids? Yes.  - safe disposition is determined? yes  -Discharge planning is complete? yes.  -PT and OT evaluation complete? Yes.      Transportation arranged for 2000.  HE to transport pt via  to [facility].

## 2017-04-07 NOTE — DISCHARGE SUMMARY
Discharge Summary    Brenda Clancy MRN# [id number]   YOB: 1950 Age: 66 year old     Date of Admission:  4/5/2017  Date of Discharge:  4/7/2017  Admitting Physician:  Paras Gold MD  Discharge Physician:  Mitul Rosenthal MD  Discharging Service:  Emergency Medicine     Primary Provider: Joseph Alcantara          Discharge Diagnosis:   Fall  Generalized weakness             Discharge Disposition:   Discharged to Transitional Care Unit           Condition on Discharge:   Discharge condition: Stable   Code status on discharge: Full Code           Procedures:   CXR  XR Pelvis  CT Head w/o contrast  CT Cervical Spine w/o contrast  CT Chest w/o contrast  EKG         Discharge Medications:     Discharge Medication List as of 4/7/2017  6:33 PM      START taking these medications    Details   ciprofloxacin (CIPRO) 250 MG tablet Take 1 tablet (250 mg) by mouth every 12 hours for 3 doses, R-0, Transitional         CONTINUE these medications which have NOT CHANGED    Details   Ropinirole HCl (REQUIP XL) 12 MG TB24 Take 1 tablet by mouth every evening , Historical      fish oil-omega-3 fatty acids 1000 MG capsule Take 1 g by mouth daily, Historical      carbidopa-levodopa (SINEMET)  MG per tablet Take 2 tablets by mouth 5 times daily, Historical      naproxen sodium (ALEVE) 220 MG capsule Take 220 mg by mouth 2 times daily as needed, Disp-60 capsule, No Print Out      calcium carb 1250 mg, 500 mg [place],/vitamin D 200 units (OSCAL WITH D) 500-200 MG-UNIT per tablet Take 1 tablet by mouth 2 times daily (with meals), Disp-90 tablet, No Print Out                   Consultations:   Consultation during this admission received from Physical therapy; , RN Care Coordination             Brief History of Illness:   Please see detailed H&P from 04/05/2017, in brief: Brenda Clancy is a 66 year old female with Hx Parkinson's disease, hypothyroidism, physical deconditioning, who  fell from toilet and struck her head without injury or positive LOC. Admitted to the observation unit for placement.           Hospital Course and Discharge by Plan:   ## 1. Falls:  ## 2. Parkinson's Disease: Unsteady gait and uses a walker with assist. Does not ambulate on her own. Also has a w/c. Patient is generally deconditioned. Multiple falls at home. No injury, negative trauma workup in the ER including negative head CT. EKG showed junctional rhythm HR 57 with possible LVH/cannot rule out anterior infarct. No acute ST-T changes seen on EKG; Chest x-ray showed patchy opacification of the left lung base and could not exclude small left effusion-> CT Chest obtained showed old left posterior rib fractures and specifically clear lungs.    Hgb 14.3. No altered mental status.  Vital signs remained stable during admission.Tolerated oral fluids and food. No choking or swallowing issues. Physical therapy recommended discharge to TCU to progress safety and IND with functional mobility prior to return home.   - Continue Sinemet and Requip per home medication regimen  - Will need outpatient neurology in the next 14 days for updated management of pt's Parkinson's disease.   Neurology Clinic (phone: [phone]).    04/07/2017 @ 16:00: Susanne  received information patient accepted at [facility]. Transport scheduled for 8 pm tonight.  ## 2. Dehydration: CMP suggestive of mild dehydration on admission Sodium 145; BUN 33; magnesium 2.4; Creatinine 0.78;  likely secondary to poor oral intake and perhaps lack of frequent access to consume adequate fluids (due to mobility limitations). Received IV hydration during admission. Repeat BMP Chloride 110-> NS switched to LR; Potassium 4.0; Sodium 144; BUN 18; Creatinine 0.82; Glucose 97  -Push oral fluids as tolerated.      ## 3. UTI: Contaminated urine specimen with > 100, 000 mixed kirsty. Remained afebrile. No leukocytosis.   No altered mental status. Denies  dyuria, frequency or flank pain. Has received Ciprofloxacin po x 2 doses.   - Ciprofloxacin 500 mg po BID x 2 days (total 3 days)  - Push oral fluids  - Empty bladder frequently            Final Day of Progress before Discharge:       Physical Exam:  Blood pressure 136/86, pulse 54, temperature 97.7  F (36.5  C), temperature source Oral, resp. rate 16, weight 53.1 kg (117 lb), last menstrual period 07/08/2003, SpO2 97 %.    EXAM:  GENERAL: Alert and oriented x 3. NAD. Flat affect   HEENT: Anicteric sclera. PERRL. Mucous membranes moist and without lesions.    CV: RRR. S1, S2. No murmurs appreciated.    RESPIRATORY: Effort normal. Lungs CTAB with no wheezing, rales, rhonchi.    GI: Abdomen soft and non distended with normoactive bowel sounds present in all quadrants. No tenderness, rebound, guarding.    MUSCULOSKELETAL: No joint swelling or tenderness. Moves all extremities.    NEUROLOGICAL: No focal deficits. Motor strength 5/5 RUE, LUE and 4/5 RLE, LLE.    EXTREMITIES: No peripheral edema. Intact bilateral pedal pulses.    SKIN: No jaundice. No rashes         Data:  All laboratory data reviewed             Significant Results:   None  Results for orders placed or performed during the hospital encounter of 04/05/17   Chest  XR, 1 view portable    Narrative    XR CHEST PORT 1 VW 4/5/2017 3:50 PM    HISTORY: Trauma.    COMPARISON: October 27, 2013.      Impression    IMPRESSION: There is patchy opacification of the left lung base. The  right lung is clear. Cannot exclude a small left effusion. No  pneumothorax.    ANGUS FERNÁNDEZ MD   XR Pelvis Port 1/2 Views    Narrative    XR PELVIS PORT 1/2 VW 4/5/2017 3:52 PM    HISTORY: Trauma.    COMPARISON: September 10, 2013.      Impression    IMPRESSION: No evidence of acute fracture or malalignment.    ANGUS FERNÁNDEZ MD   Chest CT w/o contrast    Narrative    CT CHEST WITHOUT CONTRAST  4/5/2017 6:03 PM    HISTORY: Left-sided opacity on a recent radiograph.     COMPARISON: A  chest radiograph earlier today.    TECHNIQUE: Routine transverse CT imaging of the chest was performed  without contrast. Radiation dose for this scan was reduced using  automated exposure control, adjustment of the mA and/or kV according  to patient size, or iterative reconstruction technique.    FINDINGS: The heart size is normal. No enlarged lymph node or other  abnormal mediastinal mass is seen. The thoracic aorta and pulmonary  arteries are unremarkable, allowing for the absence of contrast. The  lungs are clear. No pneumothorax is demonstrated. No pleural effusion  is identified. There appear to be old left posterior rib fractures. No  acute fracture or other osseous abnormality is seen. No chest wall  pathology is seen. The visualized upper abdomen is unremarkable.      Impression    IMPRESSION: Unremarkable unenhanced CT of the chest. Specifically, the  lungs are clear.    JEREMY HUIZAR MD   Head CT w/o contrast    Narrative    CT SCAN OF THE HEAD WITHOUT CONTRAST   4/5/2017 6:02 PM     HISTORY: Trauma. Parkinsonism.     TECHNIQUE:  Axial images of the head and coronal reformations without  IV contrast material. Radiation dose for this scan was reduced using  automated exposure control, adjustment of the mA and/or kV according  to patient size, or iterative reconstruction technique.    COMPARISON: 10/27/2013.    FINDINGS:  There is generalized atrophy of the brain.  There is low  attenuation in the white matter of the cerebral hemispheres consistent  with sequelae of small vessel ischemic disease. There is no evidence  of intracranial hemorrhage, mass, acute infarct or anomaly.     The visualized portions of the sinuses and mastoids appear normal.  There is no evidence of trauma.      Impression    IMPRESSION:   1. No acute abnormality.  2. Atrophy of the brain.  White matter changes consistent with  sequelae of small vessel ischemic disease. This is unchanged.    CLARISSE BOYD MD   Cervical spine CT  w/o contrast    Narrative    CT CERVICAL SPINE WITHOUT CONTRAST   4/5/2017 6:02 PM     HISTORY: Trauma. Parkinson's disease.     TECHNIQUE: Axial images of the cervical spine were obtained without  intravenous contrast. Multiplanar reformations were performed.  Radiation dose for this scan was reduced using automated exposure  control, adjustment of the mA and/or kV according to patient size, or  iterative reconstruction technique.    COMPARISON: None.    FINDINGS: There is no evidence of fracture. Motion artifacts limit  detail, however. There is multilevel degenerative disc disease  especially at C5-C6 and C6-C7. There is multilevel degenerative facet  arthropathy bilaterally. There is grade 1 subluxation of C4 on C5.  There is no paraspinous soft tissue swelling. Spinal canal is widely  patent.      Impression    IMPRESSION:  1. Degenerative changes.  2. No evidence of acute trauma.  3. Motion artifacts limit detail for small fractures.    CLARISSE BOYD MD   CBC with platelets differential   Result Value Ref Range    WBC 7.3 4.0 - 11.0 10e9/L    RBC Count 4.68 3.8 - 5.2 10e12/L    Hemoglobin 14.3 11.7 - 15.7 g/dL    Hematocrit 43.1 35.0 - 47.0 %    MCV 92 78 - 100 fl    MCH 30.6 26.5 - 33.0 pg    MCHC 33.2 31.5 - 36.5 g/dL    RDW 12.9 10.0 - 15.0 %    Platelet Count 217 150 - 450 10e9/L    Diff Method Automated Method     % Neutrophils 68.7 %    % Lymphocytes 22.3 %    % Monocytes 5.5 %    % Eosinophils 3.3 %    % Basophils 0.1 %    % Immature Granulocytes 0.1 %    Nucleated RBCs 0 0 /100    Absolute Neutrophil 5.0 1.6 - 8.3 10e9/L    Absolute Lymphocytes 1.6 0.8 - 5.3 10e9/L    Absolute Monocytes 0.4 0.0 - 1.3 10e9/L    Absolute Eosinophils 0.2 0.0 - 0.7 10e9/L    Absolute Basophils 0.0 0.0 - 0.2 10e9/L    Abs Immature Granulocytes 0.0 0 - 0.4 10e9/L    Absolute Nucleated RBC 0.0    Comprehensive metabolic panel   Result Value Ref Range    Sodium 145 (H) 133 - 144 mmol/L    Potassium 4.2 3.4 - 5.3 mmol/L     Chloride 110 (H) 94 - 109 mmol/L    Carbon Dioxide 29 20 - 32 mmol/L    Anion Gap 6 3 - 14 mmol/L    Glucose 88 70 - 99 mg/dL    Urea Nitrogen 33 (H) 7 - 30 mg/dL    Creatinine 0.78 0.52 - 1.04 mg/dL    GFR Estimate 74 >60 mL/min/1.7m2    GFR Estimate If Black 90 >60 mL/min/1.7m2    Calcium 9.7 8.5 - 10.1 mg/dL    Bilirubin Total 0.6 0.2 - 1.3 mg/dL    Albumin 4.0 3.4 - 5.0 g/dL    Protein Total 7.3 6.8 - 8.8 g/dL    Alkaline Phosphatase 126 40 - 150 U/L    ALT 8 0 - 50 U/L    AST 22 0 - 45 U/L   Magnesium   Result Value Ref Range    Magnesium 2.4 (H) 1.6 - 2.3 mg/dL   Phosphorus   Result Value Ref Range    Phosphorus 3.2 2.5 - 4.5 mg/dL   TSH   Result Value Ref Range    TSH 3.89 0.40 - 4.00 mU/L   UA with Microscopic reflex to Culture   Result Value Ref Range    Color Urine Yellow     Appearance Urine Slightly Cloudy     Glucose Urine Negative NEG mg/dL    Bilirubin Urine Negative NEG    Ketones Urine Negative NEG mg/dL    Specific Gravity Urine 1.016 1.003 - 1.035    Blood Urine Negative NEG    pH Urine 6.5 5.0 - 7.0 pH    Protein Albumin Urine Negative NEG mg/dL    Urobilinogen mg/dL Normal 0.0 - 2.0 mg/dL    Nitrite Urine Negative NEG    Leukocyte Esterase Urine Large (A) NEG    Source Unknown     WBC Urine 27 (H) 0 - 2 /HPF    RBC Urine 2 0 - 2 /HPF    Squamous Epithelial /HPF Urine 5 (H) 0 - 1 /HPF    Transitional Epi 1 0 - 1 /HPF    Mucous Urine Present (A) NEG /LPF   Basic metabolic panel   Result Value Ref Range    Sodium 144 133 - 144 mmol/L    Potassium 4.0 3.4 - 5.3 mmol/L    Chloride 110 (H) 94 - 109 mmol/L    Carbon Dioxide 26 20 - 32 mmol/L    Anion Gap 8 3 - 14 mmol/L    Glucose 97 70 - 99 mg/dL    Urea Nitrogen 18 7 - 30 mg/dL    Creatinine 0.82 0.52 - 1.04 mg/dL    GFR Estimate 69 >60 mL/min/1.7m2    GFR Estimate If Black 84 >60 mL/min/1.7m2    Calcium 8.7 8.5 - 10.1 mg/dL   EKG 12-lead, tracing only   Result Value Ref Range    Interpretation ECG Click View Image link to view waveform and result     Urine Culture Aerobic Bacterial   Result Value Ref Range    Specimen Description Unspecified Urine     Special Requests Specimen received in preservative     Culture Micro >100,000 colonies/mL mixed urogenital kirsty     Micro Report Status FINAL 04/07/2017       Recent Results (from the past 48 hour(s))   Chest  XR, 1 view portable    Narrative    XR CHEST PORT 1 VW 4/5/2017 3:50 PM    HISTORY: Trauma.    COMPARISON: October 27, 2013.      Impression    IMPRESSION: There is patchy opacification of the left lung base. The  right lung is clear. Cannot exclude a small left effusion. No  pneumothorax.    ANGUS FERNÁNDEZ MD   XR Pelvis Port 1/2 Views    Narrative    XR PELVIS PORT 1/2 VW 4/5/2017 3:52 PM    HISTORY: Trauma.    COMPARISON: September 10, 2013.      Impression    IMPRESSION: No evidence of acute fracture or malalignment.    ANGUS FERNÁNDEZ MD   Head CT w/o contrast    Narrative    CT SCAN OF THE HEAD WITHOUT CONTRAST   4/5/2017 6:02 PM     HISTORY: Trauma. Parkinsonism.     TECHNIQUE:  Axial images of the head and coronal reformations without  IV contrast material. Radiation dose for this scan was reduced using  automated exposure control, adjustment of the mA and/or kV according  to patient size, or iterative reconstruction technique.    COMPARISON: 10/27/2013.    FINDINGS:  There is generalized atrophy of the brain.  There is low  attenuation in the white matter of the cerebral hemispheres consistent  with sequelae of small vessel ischemic disease. There is no evidence  of intracranial hemorrhage, mass, acute infarct or anomaly.     The visualized portions of the sinuses and mastoids appear normal.  There is no evidence of trauma.      Impression    IMPRESSION:   1. No acute abnormality.  2. Atrophy of the brain.  White matter changes consistent with  sequelae of small vessel ischemic disease. This is unchanged.    CLARISSE BOYD MD   Cervical spine CT w/o contrast    Narrative    CT CERVICAL SPINE WITHOUT  CONTRAST   4/5/2017 6:02 PM     HISTORY: Trauma. Parkinson's disease.     TECHNIQUE: Axial images of the cervical spine were obtained without  intravenous contrast. Multiplanar reformations were performed.  Radiation dose for this scan was reduced using automated exposure  control, adjustment of the mA and/or kV according to patient size, or  iterative reconstruction technique.    COMPARISON: None.    FINDINGS: There is no evidence of fracture. Motion artifacts limit  detail, however. There is multilevel degenerative disc disease  especially at C5-C6 and C6-C7. There is multilevel degenerative facet  arthropathy bilaterally. There is grade 1 subluxation of C4 on C5.  There is no paraspinous soft tissue swelling. Spinal canal is widely  patent.      Impression    IMPRESSION:  1. Degenerative changes.  2. No evidence of acute trauma.  3. Motion artifacts limit detail for small fractures.    CLARISSE BOYD MD   Chest CT w/o contrast    Narrative    CT CHEST WITHOUT CONTRAST  4/5/2017 6:03 PM    HISTORY: Left-sided opacity on a recent radiograph.     COMPARISON: A chest radiograph earlier today.    TECHNIQUE: Routine transverse CT imaging of the chest was performed  without contrast. Radiation dose for this scan was reduced using  automated exposure control, adjustment of the mA and/or kV according  to patient size, or iterative reconstruction technique.    FINDINGS: The heart size is normal. No enlarged lymph node or other  abnormal mediastinal mass is seen. The thoracic aorta and pulmonary  arteries are unremarkable, allowing for the absence of contrast. The  lungs are clear. No pneumothorax is demonstrated. No pleural effusion  is identified. There appear to be old left posterior rib fractures. No  acute fracture or other osseous abnormality is seen. No chest wall  pathology is seen. The visualized upper abdomen is unremarkable.      Impression    IMPRESSION: Unremarkable unenhanced CT of the chest. Specifically,  the  lungs are clear.    JEREMY HUIZAR MD                Pending Results:   Unresulted Labs Ordered in the Past 30 Days of this Admission     No orders found from 2/4/2017 to 4/6/2017.                  Discharge Instructions and Follow-Up:     Discharge Procedure Orders  General info for SNF   Order Comments: Length of Stay Estimate: Short Term Care: Estimated # of Days <30  Condition at Discharge: Stable  Level of care:skilled   Rehabilitation Potential: Fair  Admission H&P remains valid and up-to-date: Yes  Recent Chemotherapy: N/A  Use Nursing Home Standing Orders: Yes     Mantoux instructions   Order Comments: Give two-step Mantoux (PPD) Per Facility Policy Yes     Reason for your hospital stay   Order Comments: Mrs. Swartz, you have been evaluated for generalized weakness; Imbalance problems with multiple falls.  You were evaluated by physical therapy who recommended TCU placement.  Ciprofloxacin for 3 more doses: Dx: Urinary Tract Infection  Follow up with TCU primary provider in 1-3 days.     Intake and output   Order Comments: Every shift     Activity - Up with nursing assistance   Order Specific Question Answer Comments   Is discharge order? Yes      Encourage PO fluids     Full Code     Physical Therapy Adult Consult   Order Comments: Evaluate and treat as clinically indicated.    Reason:  Physical Deconditioning     Occupational Therapy Adult Consult   Order Comments: Evaluate and treat as clinically indicated.    Reason:  Physical Deconditioning     Fall precautions     Advance Diet as Tolerated   Order Comments: Follow this diet upon discharge: Orders Placed This Encounter     Regular Diet   Provide snacks between meals   Order Specific Question Answer Comments   Is discharge order? Yes           Discussed case with Dr. Rosenthal.    Attestation:  I have reviewed all labs, vital signs, imaging and nurses notes.    Marcos Middleton, APRN, CNP  ED Observation Unit  [facility]

## 2017-04-07 NOTE — PLAN OF CARE
Problem: Discharge Planning  Goal: Discharge Planning (Adult, OB, Behavioral, Peds)  -Taking adequate food and fluids? Yes, Patient tolerating small amounts of PO intake well. Continued intake is encouraged. Patient also has orders for iv fluids  - safe disposition is determined?. No  -Discharge planning is complete? No  -PT and OT evaluation complete? No. PT consult completed.

## 2017-04-07 NOTE — PROGRESS NOTES
8:52 AM  Patient seen and examined, electronic medical record reviewed.  Plan discussed with JOANN.  Subjective: Patient states that she had 2 episodes of urination during the night but denies dysuria, frequency other than as noted above and urgency.  Patient does note that she is not particularly happy about recommendations for TCU placement.  Objective:  Vitals:    04/06/17 2027 04/06/17 2240 04/07/17 0328 04/07/17 0752   BP: 137/78 117/81 91/61 136/86   BP Location: Left arm Left arm  Left arm   Pulse: 72 68 54 54   Resp:   14 16   Temp: 97.9  F (36.6  C) 98.1  F (36.7  C) 96.7  F (35.9  C) 97.7  F (36.5  C)   TempSrc: Oral Oral Axillary Oral   SpO2: 96% 98% 97% 97%   Weight:         Patient appears overall somewhat slowed both in speech and neuromuscular activity.  Chest was clear to auscultation.  Her neurovascular exam regular rate and rhythm.  Abdomen was soft.  Results for orders placed or performed during the hospital encounter of 04/05/17   Chest  XR, 1 view portable    Narrative    XR CHEST PORT 1 VW 4/5/2017 3:50 PM    HISTORY: Trauma.    COMPARISON: October 27, 2013.      Impression    IMPRESSION: There is patchy opacification of the left lung base. The  right lung is clear. Cannot exclude a small left effusion. No  pneumothorax.    ANGUS FERNÁNDEZ MD   XR Pelvis Port 1/2 Views    Narrative    XR PELVIS PORT 1/2 VW 4/5/2017 3:52 PM    HISTORY: Trauma.    COMPARISON: September 10, 2013.      Impression    IMPRESSION: No evidence of acute fracture or malalignment.    ANGUS FERNÁNDEZ MD   Chest CT w/o contrast    Narrative    CT CHEST WITHOUT CONTRAST  4/5/2017 6:03 PM    HISTORY: Left-sided opacity on a recent radiograph.     COMPARISON: A chest radiograph earlier today.    TECHNIQUE: Routine transverse CT imaging of the chest was performed  without contrast. Radiation dose for this scan was reduced using  automated exposure control, adjustment of the mA and/or kV according  to patient size, or iterative  reconstruction technique.    FINDINGS: The heart size is normal. No enlarged lymph node or other  abnormal mediastinal mass is seen. The thoracic aorta and pulmonary  arteries are unremarkable, allowing for the absence of contrast. The  lungs are clear. No pneumothorax is demonstrated. No pleural effusion  is identified. There appear to be old left posterior rib fractures. No  acute fracture or other osseous abnormality is seen. No chest wall  pathology is seen. The visualized upper abdomen is unremarkable.      Impression    IMPRESSION: Unremarkable unenhanced CT of the chest. Specifically, the  lungs are clear.    JEREMY HUIZAR MD   Head CT w/o contrast    Narrative    CT SCAN OF THE HEAD WITHOUT CONTRAST   4/5/2017 6:02 PM     HISTORY: Trauma. Parkinsonism.     TECHNIQUE:  Axial images of the head and coronal reformations without  IV contrast material. Radiation dose for this scan was reduced using  automated exposure control, adjustment of the mA and/or kV according  to patient size, or iterative reconstruction technique.    COMPARISON: 10/27/2013.    FINDINGS:  There is generalized atrophy of the brain.  There is low  attenuation in the white matter of the cerebral hemispheres consistent  with sequelae of small vessel ischemic disease. There is no evidence  of intracranial hemorrhage, mass, acute infarct or anomaly.     The visualized portions of the sinuses and mastoids appear normal.  There is no evidence of trauma.      Impression    IMPRESSION:   1. No acute abnormality.  2. Atrophy of the brain.  White matter changes consistent with  sequelae of small vessel ischemic disease. This is unchanged.    CLARISSE BOYD MD   Cervical spine CT w/o contrast    Narrative    CT CERVICAL SPINE WITHOUT CONTRAST   4/5/2017 6:02 PM     HISTORY: Trauma. Parkinson's disease.     TECHNIQUE: Axial images of the cervical spine were obtained without  intravenous contrast. Multiplanar reformations were  performed.  Radiation dose for this scan was reduced using automated exposure  control, adjustment of the mA and/or kV according to patient size, or  iterative reconstruction technique.    COMPARISON: None.    FINDINGS: There is no evidence of fracture. Motion artifacts limit  detail, however. There is multilevel degenerative disc disease  especially at C5-C6 and C6-C7. There is multilevel degenerative facet  arthropathy bilaterally. There is grade 1 subluxation of C4 on C5.  There is no paraspinous soft tissue swelling. Spinal canal is widely  patent.      Impression    IMPRESSION:  1. Degenerative changes.  2. No evidence of acute trauma.  3. Motion artifacts limit detail for small fractures.    CLARISSE BOYD MD   CBC with platelets differential   Result Value Ref Range    WBC 7.3 4.0 - 11.0 10e9/L    RBC Count 4.68 3.8 - 5.2 10e12/L    Hemoglobin 14.3 11.7 - 15.7 g/dL    Hematocrit 43.1 35.0 - 47.0 %    MCV 92 78 - 100 fl    MCH 30.6 26.5 - 33.0 pg    MCHC 33.2 31.5 - 36.5 g/dL    RDW 12.9 10.0 - 15.0 %    Platelet Count 217 150 - 450 10e9/L    Diff Method Automated Method     % Neutrophils 68.7 %    % Lymphocytes 22.3 %    % Monocytes 5.5 %    % Eosinophils 3.3 %    % Basophils 0.1 %    % Immature Granulocytes 0.1 %    Nucleated RBCs 0 0 /100    Absolute Neutrophil 5.0 1.6 - 8.3 10e9/L    Absolute Lymphocytes 1.6 0.8 - 5.3 10e9/L    Absolute Monocytes 0.4 0.0 - 1.3 10e9/L    Absolute Eosinophils 0.2 0.0 - 0.7 10e9/L    Absolute Basophils 0.0 0.0 - 0.2 10e9/L    Abs Immature Granulocytes 0.0 0 - 0.4 10e9/L    Absolute Nucleated RBC 0.0    Comprehensive metabolic panel   Result Value Ref Range    Sodium 145 (H) 133 - 144 mmol/L    Potassium 4.2 3.4 - 5.3 mmol/L    Chloride 110 (H) 94 - 109 mmol/L    Carbon Dioxide 29 20 - 32 mmol/L    Anion Gap 6 3 - 14 mmol/L    Glucose 88 70 - 99 mg/dL    Urea Nitrogen 33 (H) 7 - 30 mg/dL    Creatinine 0.78 0.52 - 1.04 mg/dL    GFR Estimate 74 >60 mL/min/1.7m2    GFR Estimate  If Black 90 >60 mL/min/1.7m2    Calcium 9.7 8.5 - 10.1 mg/dL    Bilirubin Total 0.6 0.2 - 1.3 mg/dL    Albumin 4.0 3.4 - 5.0 g/dL    Protein Total 7.3 6.8 - 8.8 g/dL    Alkaline Phosphatase 126 40 - 150 U/L    ALT 8 0 - 50 U/L    AST 22 0 - 45 U/L   Magnesium   Result Value Ref Range    Magnesium 2.4 (H) 1.6 - 2.3 mg/dL   Phosphorus   Result Value Ref Range    Phosphorus 3.2 2.5 - 4.5 mg/dL   TSH   Result Value Ref Range    TSH 3.89 0.40 - 4.00 mU/L   UA with Microscopic reflex to Culture   Result Value Ref Range    Color Urine Yellow     Appearance Urine Slightly Cloudy     Glucose Urine Negative NEG mg/dL    Bilirubin Urine Negative NEG    Ketones Urine Negative NEG mg/dL    Specific Gravity Urine 1.016 1.003 - 1.035    Blood Urine Negative NEG    pH Urine 6.5 5.0 - 7.0 pH    Protein Albumin Urine Negative NEG mg/dL    Urobilinogen mg/dL Normal 0.0 - 2.0 mg/dL    Nitrite Urine Negative NEG    Leukocyte Esterase Urine Large (A) NEG    Source Unknown     WBC Urine 27 (H) 0 - 2 /HPF    RBC Urine 2 0 - 2 /HPF    Squamous Epithelial /HPF Urine 5 (H) 0 - 1 /HPF    Transitional Epi 1 0 - 1 /HPF    Mucous Urine Present (A) NEG /LPF   Basic metabolic panel   Result Value Ref Range    Sodium 144 133 - 144 mmol/L    Potassium 4.0 3.4 - 5.3 mmol/L    Chloride 110 (H) 94 - 109 mmol/L    Carbon Dioxide 26 20 - 32 mmol/L    Anion Gap 8 3 - 14 mmol/L    Glucose 97 70 - 99 mg/dL    Urea Nitrogen 18 7 - 30 mg/dL    Creatinine 0.82 0.52 - 1.04 mg/dL    GFR Estimate 69 >60 mL/min/1.7m2    GFR Estimate If Black 84 >60 mL/min/1.7m2    Calcium 8.7 8.5 - 10.1 mg/dL   EKG 12-lead, tracing only   Result Value Ref Range    Interpretation ECG Click View Image link to view waveform and result    Urine Culture Aerobic Bacterial   Result Value Ref Range    Specimen Description Unspecified Urine     Special Requests Specimen received in preservative     Culture Micro >100,000 colonies/mL mixed urogenital kirsty     Micro Report Status FINAL  04/07/2017      S1/plan: 66-year-old female who was admitted observation status post fall with negative extensive impinging.  PT consult recommending TCU awaiting social work/nurse care coordinator recommendations for placement.  Urine culture reveals no specific organism consistent with UTI in the setting of mild nocturia.  We'll complete three-day course of Ciprofloxacin.

## 2017-04-07 NOTE — PROGRESS NOTES
Social Work Services Discharge Note      Patient Name:  Brenda Clancy     Anticipated Discharge Date:  4/7/2017    Discharge Disposition:   TCU:  [facility] ([phone] f: [phone])       Pre-Admission Screening (PAS) online form has been completed.  The Level of Care (LOC) is:  Determined  Confirmation Code is:  [id number]    Patient/caregiver informed of referral to [facility] for Pre-Admission Screening for skilled nursing facility (SNF) placement and to expect a phone call post discharge from SNF.     Additional Services/Equipment Arranged:  HE w/c transport arranged for 8pm.      Patient / Family response to discharge plan:  Pt and  in agreement w/ plan and are aware of private pay status at TCU. They are prepared to write a $4000 check upon admission to facility.      Persons notified of above discharge plan:  Pt, , via phone, care team and TCU admissions    Staff Discharge Instructions:  Please fax discharge orders and signed hard scripts for any controlled substances.  Please print a packet and send with patient.     CTS Handoff completed:  YES    Medicare Notice of Rights provided to the patient/family:  NO

## 2017-04-07 NOTE — PLAN OF CARE
Problem: Discharge Planning  Goal: Discharge Planning (Adult, OB, Behavioral, Peds)      -Taking adequate food and fluids? Yes, patient ate small dinner and requested chicken noodle soup and crackers. Patient tolerated PO intake well. Patient also has orders for iv fluids  - safe disposition is determined?. No  -Discharge planning is complete? No  -PT and OT evaluation complete? No. PT consult completed.

## 2017-04-07 NOTE — PLAN OF CARE
Problem: Goal Outcome Summary  Goal: Goal Outcome Summary  Outcome: No Change  Observation Goals:  -Taking adequate food and fluids? Yes. Ate 75% of breakfast, with adequate fluid intake.   - safe disposition is determined? No  -Discharge planning is complete? No  -PT and OT evaluation complete? Yes.

## 2017-04-07 NOTE — PROGRESS NOTES
OBSERVATION GOALS:  -Taking adequate food and fluids? Yes.  - safe disposition is determined? No.  -Discharge planning is complete? No.  -PT and OT evaluation complete? Yes.     Pt is on 6D s/p fall at home, dehydration. Hx frequent falls. Hx Parkinson's. Generalized weakness. No tremor noted. L hand contracture. Bradykinesia. Up to pivot to commode with A2, gb. Pt had large, formed bm. Tolerating regular diet with excellent appetite. PIV infusing LR@75mL/hr. Denies pain. Pt was tearful today about difficulty with TCU placement.  Continue to monitor.

## 2017-04-07 NOTE — PROGRESS NOTES
Social Work Services Progress Note    Hospital Day: 2- Observation  Collaborated with:  Pt and pt's , Ramón, both at bedside and over the phone (h: [phone] c: [phone])    Data:  SW following for TCU placement. Writer discussed w/  via phone and later at the bedside. Pt and  in agreement w/ private paying for TCU placement. [facility] did assess and declined to accept pt. Referral made to [facility].     Intervention:  D/C Planning    Assessment:  Writer observed  to be not supportive towards pt at the bedside. Pt's  informing pt that she would need to pay for the TCU out of her money and that he should not have to use his money. After some discussion, pt's  revealed that he is physically tired of taking care of pt. Normalized his feelings and identified for  that what he is experiencing is caregiver burnout. After this,  appeared to take a gentler approach in the way he spoke to pt.  needed assurance that writer was working on plan, that he and pt were in agreement w/, and that there was nothing he currently needed to do.     Plan:    Anticipated Disposition:  Facility:  Referral made to [facility]    Barriers to d/c plan:  Locating TCU bed    Follow Up:  SW awaiting completion of assessment by [facility].

## 2017-04-10 ENCOUNTER — NURSING HOME VISIT (OUTPATIENT)
Dept: GERIATRICS | Facility: CLINIC | Age: 67
End: 2017-04-10
Payer: MEDICARE

## 2017-04-10 ENCOUNTER — CARE COORDINATION (OUTPATIENT)
Dept: CARE COORDINATION | Facility: CLINIC | Age: 67
End: 2017-04-10

## 2017-04-10 VITALS
HEART RATE: 80 BPM | TEMPERATURE: 98.1 F | OXYGEN SATURATION: 94 % | SYSTOLIC BLOOD PRESSURE: 155 MMHG | DIASTOLIC BLOOD PRESSURE: 100 MMHG | RESPIRATION RATE: 16 BRPM

## 2017-04-10 VITALS
OXYGEN SATURATION: 97 % | HEART RATE: 94 BPM | TEMPERATURE: 97.4 F | SYSTOLIC BLOOD PRESSURE: 132 MMHG | RESPIRATION RATE: 16 BRPM | DIASTOLIC BLOOD PRESSURE: 90 MMHG

## 2017-04-10 DIAGNOSIS — E03.9 HYPOTHYROIDISM, UNSPECIFIED TYPE: ICD-10-CM

## 2017-04-10 DIAGNOSIS — Z71.89 ADVANCED DIRECTIVES, COUNSELING/DISCUSSION: ICD-10-CM

## 2017-04-10 DIAGNOSIS — N39.0 URINARY TRACT INFECTION, SITE UNSPECIFIED: Primary | ICD-10-CM

## 2017-04-10 DIAGNOSIS — E86.0 DEHYDRATION: ICD-10-CM

## 2017-04-10 DIAGNOSIS — M62.81 GENERALIZED MUSCLE WEAKNESS: ICD-10-CM

## 2017-04-10 DIAGNOSIS — G20.A1 PARKINSON'S DISEASE (H): ICD-10-CM
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
## 2017-04-10 PROCEDURE — 99310 SBSQ NF CARE HIGH MDM 45: CPT | Performed by: NURSE PRACTITIONER

## 2017-04-10 PROCEDURE — 99207 ZZC CDG-CORRECTLY CODED, REVIEWED AND AGREE: CPT | Performed by: NURSE PRACTITIONER

## 2017-04-10 NOTE — PROGRESS NOTES
Clinic Care Coordination Contact  Care Team Conversations    CTS received requesting care coordination services.   Patient admitted to 04/05/17-04/07/17 for fall.  PMH parkinson's.    Discharged to [facility] as private pay.  Per RAFAEL note, Pt's dtr arriving into town tomorrow (4/8/2017) to discuss LTC plan of home w/ additional services vs LTC in a facility       Reason for Communication Hand-off Referral: No support or lacking a support system   Pt's  expressed caregiver burnout and essentially expressed desire to walk out of the hospital and not be involved in d/c planning.       Key Recommendations:  If ultimate decision is to take pt home from [facility] will need increased services to assist  in caring for pt at home.       PLAN: LVM for TCU [facility], requesting notification when patient is approaching discharge.     Cheryl Welch, RN  Clinic Care Coordinator  [facility]  [phone]

## 2017-04-10 NOTE — MR AVS SNAPSHOT
"              After Visit Summary   4/10/2017    Brenda Clancy    MRN: [id number]           Patient Information     Date Of Birth          1950        Visit Information        Provider Department      4/10/2017 7:00 AM Diana Pugh APRN CNP [facility]        Today's Diagnoses     Urinary tract infection, site unspecified    -  1    Parkinson's disease (H)        Dehydration        Generalized muscle weakness        Fall, sequela        Hypothyroidism, unspecified type        Advanced directives, counseling/discussion           Follow-ups after your visit        Who to contact     If you have questions or need follow up information about today's clinic visit or your schedule please contact [facility] directly at [phone].  Normal or non-critical lab and imaging results will be communicated to you by MyChart, letter or phone within 4 business days after the clinic has received the results. If you do not hear from us within 7 days, please contact the clinic through [employer] or phone. If you have a critical or abnormal lab result, we will notify you by phone as soon as possible.  Submit refill requests through [employer] or call your pharmacy and they will forward the refill request to us. Please allow 3 business days for your refill to be completed.          Additional Information About Your Visit        MyChart Information     [employer] lets you send messages to your doctor, view your test results, renew your prescriptions, schedule appointments and more. To sign up, go to [email or web address] . Click on \"Log in\" on the left side of the screen, which will take you to the Welcome page. Then click on \"Sign up Now\" on the right side of the page.     You will be asked to enter the access code listed below, as well as some personal information. Please follow the directions to create your username and password.     Your access code is: [email or web address]  Expires: 7/6/2017 "  5:49 PM     Your access code will  in 90 days. If you need help or a new code, please call your [place] clinic or [phone].        Care EveryWhere ID     This is your Care EveryWhere ID. This could be used by other organizations to access your [place] medical records  [id number]        Your Vitals Were     Pulse Temperature Respirations Last Period Pulse Oximetry       94 97.4  F (36.3  C) 16 2003 97%        Blood Pressure from Last 3 Encounters:   04/10/17 132/90   17 122/79   16 132/78    Weight from Last 3 Encounters:   17 117 lb (53.1 kg)   16 125 lb (56.7 kg)   14 125 lb 3.2 oz (56.8 kg)              We Performed the Following     DNR/DNI        Primary Care Provider Office Phone # Fax #    Joseph Alcantara [phone]       [facility] [address]  [facility] [address]        Thank you!     Thank you for choosing [facility]  for your care. Our goal is always to provide you with excellent care. Hearing back from our patients is one way we can continue to improve our services. Please take a few minutes to complete the written survey that you may receive in the mail after your visit with us. Thank you!             Your Updated Medication List - Protect others around you: Learn how to safely use, store and throw away your medicines at www.disposemymeds.org.          This list is accurate as of: 4/10/17  1:00 PM.  Always use your most recent med list.                   Brand Name Dispense Instructions for use    calcium carb 1250 mg (500 mg [place])/vitamin D 200 units 500-200 MG-UNIT per tablet    OSCAL with D    90 tablet    Take 1 tablet by mouth 2 times daily (with meals)       carbidopa-levodopa  MG per tablet    SINEMET     Take 2 tablets by mouth 5 times daily       fish oil-omega-3 fatty acids 1000 MG capsule      Take 1 g by mouth daily       naproxen sodium 220 MG capsule    ALEVE    60  capsule    Take 220 mg by mouth 2 times daily as needed       REQUIP XL 12 MG Tb24   Generic drug:  Ropinirole HCl      Take 1 tablet by mouth every evening

## 2017-04-10 NOTE — PROGRESS NOTES
[facility]      PRIMARY CARE PROVIDER AND CLINIC:  [facility] [place] [address] / [facility] [address]    Chief Complaint   Patient presents with     Hospital F/U       HPI:    Brenda Clancy is a 67 year old  (1950),admitted to the [facility] from [facility].  Hospital stay 4/5/2017 through 4/7/2017.  Admitted to this facility for  rehab, medical management and nursing care. Current issues are:         Urinary tract infection, site unspecified  Parkinson's disease (H)  Fall  Generalized muscle weakness  Hospitalized due to fall at home. Lives with  in own home. Unsteady gait and uses a walker with assist. Does not ambulate on her own. Also has a w/c. Patient is generally deconditioned. Multiple falls at home. No injury, negative trauma workup in the ER including negative head CT. EKG showed junctional rhythm HR 57 with possible LVH/cannot rule out anterior infarct. No acute ST-T changes seen on EKG; Chest x-ray showed patchy opacification of the left lung base and could not exclude small left effusion-> CT Chest obtained showed old left posterior rib fractures and specifically clear lungs. Hgb 14.3. No altered mental status. Vital signs remained stable during admission. Had a contaminated urine specimen with > 100, 000 mixed kirsty. Remained afebrile. No leukocytosis. No altered mental status. Denies dysuria, frequency or flank pain. Has received Ciprofloxacin po x 5 days. Tolerated oral fluids and food. No choking or swallowing issues. Physical therapy recommended discharge to TCU to progress safety and IND with functional mobility prior to return home. Continue Sinemet and Requip per home medication regimen.  Will need outpatient neurology in the next 14 days for updated management of pt's Parkinson's disease. Neurology Clinic (phone: [phone].  --feels stable since discharge  and will be working with therapies. Daughter visiting from out of state - feels patient will need home care at discharge. No fevers. No urinary symptoms at this time. Needs assist with ambulation and uses walker/WC.     Dehydration  CMP suggestive of mild dehydration on admission Sodium 145; BUN 33; magnesium 2.4; Creatinine 0.78; likely secondary to poor oral intake and perhaps lack of frequent access to consume adequate fluids (due to mobility limitations). Received IV hydration during admission. Repeat BMP Chloride 110-> NS switched to LR; Potassium 4.0; Sodium 144; BUN 18; Creatinine 0.82; Glucose 97. Push oral fluids as tolerated.   --plan on renal function f/u this week.       Hypothyroidism, unspecified type  Lab Results   Component Value Date    TSH 3.89 04/05/2017    TSH 4.05 06/17/2016        Advanced directives, counseling/discussion   Completed POLST - DNR/DNI per patient wishes. Daughter present for discussion.     CODE STATUS/ADVANCE DIRECTIVES DISCUSSION:   DNR/DNI    Patient's living condition: lives with spouse    ALLERGIES:No known allergies and No known drug allergies  PAST MEDICAL HISTORY:  has a past medical history of Bunion (4/11/00); Cyst of Bartholin's gland; Delirium (10/27/2013); HYPOTHYROIDISM; Paralysis agitans (H) (3/13/01); and Symptomatic menopausal or female climacteric states (1/29/01).  PAST SURGICAL HISTORY:  has a past surgical history that includes NONSPECIFIC PROCEDURE and NONSPECIFIC PROCEDURE (4/13/00).  FAMILY HISTORY: family history includes Breast Cancer in her sister; CANCER in her mother; Cancer - colorectal in her father; DIABETES in her father and mother.  SOCIAL HISTORY:  reports that she quit smoking about 47 years ago. She has never used smokeless tobacco. She reports that she does not drink alcohol or use illicit drugs.    Post Discharge Medication Reconciliation Status: discharge medications reconciled, continue medications without change.  Current Outpatient  Prescriptions   Medication Sig Dispense Refill     Ropinirole HCl (REQUIP XL) 12 MG TB24 Take 1 tablet by mouth every evening        fish oil-omega-3 fatty acids 1000 MG capsule Take 1 g by mouth daily       carbidopa-levodopa (SINEMET)  MG per tablet Take 2 tablets by mouth 5 times daily       naproxen sodium (ALEVE) 220 MG capsule Take 220 mg by mouth 2 times daily as needed 60 capsule      calcium carb 1250 mg, 500 mg [place],/vitamin D 200 units (OSCAL WITH D) 500-200 MG-UNIT per tablet Take 1 tablet by mouth 2 times daily (with meals) 90 tablet        ROS:  10 point ROS of systems including Constitutional, Eyes, Respiratory, Cardiovascular, Gastroenterology, Genitourinary, Integumentary, Musculoskeletal, Psychiatric were all negative except for pertinent positives noted in my HPI.    Exam:  /90  Pulse 94  Temp 97.4  F (36.3  C)  Resp 16  LMP 07/08/2003  SpO2 97%   GENERAL APPEARANCE:  Alert, in no distress, pleasant, cooperative, oriented x self, place, month/year  EYES:  EOM, lids, pupils and irises normal, sclera clear and conjunctiva normal, no discharge or mattering on lids or lashes noted  ENT:  Mouth normal, moist mucous membranes, nose normal without drainage or crusting, external ears without lesions, hearing acuity intact  RESP:  respiratory effort and palpation of chest normal, no chest wall tenderness, no respiratory distress, Lung sounds clear, patient is on room air  CV:  Palpation and auscultation of heart done, rate and rhythm controlled and regular, no murmur, no rub or gallop. Edema none bilateral lower extremities. VASCULAR: no open areas on extremities  ABDOMEN:  normal bowel sounds, soft, nontender, no grimacing or guarding with palpation, no hepatosplenomegaly or other masses  M/S:   Gait and station unsafe without assistance and up with walker and has shuffling gate, left leg appears weaker than right, Digits and nails normal, no tenderness or swelling of the joints; able to  move all extremities generalized stiffness and weakness  NEURO: cranial nerves 2-12 grossly intact, no facial asymmetry, no speech deficits and able to follow directions, moves all extremities symmetrically  PSYCH:  insight and judgement at baseline, memory somewhat forgetful, affect and mood normal     BP Readin-132 / 42-90    Lab/Diagnostic data:     CBC RESULTS:   Recent Labs   Lab Test  17   1608  16   1139   WBC  7.3  7.2   RBC  4.68  4.27   HGB  14.3  13.2   HCT  43.1  40.1   MCV  92  94   MCH  30.6  30.9   MCHC  33.2  32.9   RDW  12.9  12.7   PLT  217  229       Last Basic Metabolic Panel:  Recent Labs   Lab Test  17   0636  17   1608   NA  144  145*   POTASSIUM  4.0  4.2   CHLORIDE  110*  110*   EMMANUEL  8.7  9.7   CO2  26  29   BUN  18  33*   CR  0.82  0.78   GLC  97  88       Liver Function Studies :  Recent Labs   Lab Test  17   1608  16   1139   PROTTOTAL  7.3  7.6   ALBUMIN  4.0  3.9   BILITOTAL  0.6  0.4   ALKPHOS  126  106   AST  22  21   ALT  8  13       TSH   Date Value Ref Range Status   2017 3.89 0.40 - 4.00 mU/L Final   2016 4.05 (H) 0.40 - 4.00 mU/L Final       ASSESSMENT/PLAN:     Urinary tract infection, site unspecified - resolved. Monitor.   Parkinson's disease (H) - chronic. Meds as above. Set up consult with neurology for f/u as recommended at [facility].   Dehydration - resolving. F/U with renal function on 17  Generalized muscle weakness - acute on chronic. Therapies.   Fall - as above.   Hypothyroidism, unspecified type - chronic. Meds as above.   Advanced directives, counseling/discussion - completed POLST.     Orders:  1. BMP, CBC on  diagnosis weakness, dehydration  2. Code status: DNR/DNI    Information reviewed:  Medications, vital signs, orders, nursing notes, problem list, hospital information. Total time spent with patient visit was 35 min including patient visit, review of past records and discussion of patient status and POC  with staff. Greater than 50% of total time spent with counseling and coordinating care.    Electronically signed by:  RAJ Khan CNP

## 2017-04-12 ENCOUNTER — NURSING HOME VISIT (OUTPATIENT)
Dept: GERIATRICS | Facility: CLINIC | Age: 67
End: 2017-04-12
Payer: MEDICARE

## 2017-04-12 ENCOUNTER — TRANSFERRED RECORDS (OUTPATIENT)
Dept: HEALTH INFORMATION MANAGEMENT | Facility: CLINIC | Age: 67
End: 2017-04-12

## 2017-04-12 DIAGNOSIS — G20.A1 PARKINSON'S DISEASE (H): Primary | ICD-10-CM
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
## 2017-04-12 DIAGNOSIS — R53.81 PHYSICAL DECONDITIONING: ICD-10-CM

## 2017-04-12 DIAGNOSIS — E03.9 HYPOTHYROIDISM, UNSPECIFIED TYPE: ICD-10-CM

## 2017-04-12 LAB
ANION GAP SERPL CALCULATED.3IONS-SCNC: 10 MMOL/L (ref 5–18)
BUN SERPL-MCNC: 23 MG/DL (ref 8–22)
CALCIUM SERPL-MCNC: 9.8 MG/DL (ref 8.5–10.5)
CHLORIDE SERPLBLD-SCNC: 106 MMOL/L (ref 98–107)
CO2 SERPL-SCNC: 25 MMOL/L (ref 22–31)
CREAT SERPL-MCNC: 0.78 MG/DL (ref 0.6–1.1)
DIFFERENTIAL: NORMAL
ERYTHROCYTE [DISTWIDTH] IN BLOOD BY AUTOMATED COUNT: 12.8 % (ref 11–14.5)
GFR SERPL CREATININE-BSD FRML MDRD: >60 ML/MIN/1.73M2
GLUCOSE SERPL-MCNC: 85 MG/DL (ref 70–125)
HCT VFR BLD AUTO: 40.4 % (ref 35–47)
HEMOGLOBIN: 13.6 G/DL (ref 12–16)
MCH RBC QN AUTO: 31.6 PG (ref 27–34)
MCHC RBC AUTO-ENTMCNC: 33.7 G/DL (ref 32–36)
MCV RBC AUTO: 94 FL (ref 80–100)
PLATELET # BLD AUTO: 204 THOU/UL (ref 140–440)
POTASSIUM SERPL-SCNC: 4 MMOL/L (ref 3.5–5)
RBC # BLD AUTO: 4.3 MILL/UL (ref 3.8–5.4)
SODIUM SERPL-SCNC: 141 MMOL/L (ref 136–145)
WBC # BLD AUTO: 5 THOU/UL (ref 4–11)

## 2017-04-12 PROCEDURE — 99207 ZZC CDG-CORRECTLY CODED, REVIEWED AND AGREE: CPT | Performed by: INTERNAL MEDICINE

## 2017-04-12 PROCEDURE — 99306 1ST NF CARE HIGH MDM 50: CPT | Performed by: INTERNAL MEDICINE

## 2017-04-12 NOTE — PROGRESS NOTES
"[facility]  INITIAL VISIT NOTE  April 12, 2017    PRIMARY CARE PROVIDER AND CLINIC:  [facility] [place] [address] / [place]*    Chief Complaint   Patient presents with     Hospital F/U       HPI:    Brenda Clancy is a 67 year old  (1950) female [facility]. Medical history is notable for Parkinson's disease and hypothyroidism. She was hospitalized at [facility] from 4/5/17 to 4/7/17 where she presented after a fall. Etiology secondary to overall debility from her Parkinson's. Labs suggestive of mild hypovolemia, otherwise no infectious or metabolic etiology for her fall. Social work was consulted for placement as she was not safe to return home. She was admitted to this facility for medical management and rehab.     Today, Ms. Maxime Clancy is seen in her room after OT. Some paucity of speech. Says she is \"OK\". No chest pain or dyspnea. No abdominal pain. Working with therapies.     CODE STATUS:   DNR / DNI    ALLERGIES:     Allergies   Allergen Reactions     No Known Allergies      No Known Drug Allergies        PAST MEDICAL HISTORY:   Past Medical History:   Diagnosis Date     Bunion 4/11/00    RIGHT FIRST TOE     Cyst of Bartholin's gland     COLPOSCOPY PER PT -EARLY '80'S - WAS OKAY     Delirium 10/27/2013     HYPOTHYROIDISM      Paralysis agitans (H) 3/13/01    PARKINSON 'S DZ - DR CRAWFORD,NEUROLOGY     Symptomatic menopausal or female climacteric states 1/29/01       PAST SURGICAL HISTORY:   Past Surgical History:   Procedure Laterality Date     C NONSPECIFIC PROCEDURE      S/P EXCISED GANGLION,PALM LEFT HAND     C NONSPECIFIC PROCEDURE  4/13/00    [facility] BUNIONECTOMY RIGHT FIRST TOE       FAMILY HISTORY:   Family History   Problem Relation Age of Onset     CANCER Mother      UTERINE     DIABETES Mother      ADULT ONSET     Cancer - colorectal Father      DIABETES Father      " ADULT ONSET     Breast Cancer Sister        SOCIAL HISTORY:   Lives with spouse     MEDICATIONS:  Current Outpatient Prescriptions   Medication Sig Dispense Refill     Ropinirole HCl (REQUIP XL) 12 MG TB24 Take 1 tablet by mouth every evening        fish oil-omega-3 fatty acids 1000 MG capsule Take 1 g by mouth daily       carbidopa-levodopa (SINEMET)  MG per tablet Take 2 tablets by mouth 5 times daily       naproxen sodium (ALEVE) 220 MG capsule Take 220 mg by mouth 2 times daily as needed 60 capsule      calcium carb 1250 mg, 500 mg [place],/vitamin D 200 units (OSCAL WITH D) 500-200 MG-UNIT per tablet Take 1 tablet by mouth 2 times daily (with meals) 90 tablet        Post Discharge Medication Reconciliation Status: medication reconcilation previously completed during another office visit.    ROS:  10 point ROS neg other than the symptoms noted above in the HPI.    PHYSICAL EXAM:  BP (!) 155/100  Pulse 80  Temp 98.1  F (36.7  C)  Resp 16  LMP 07/08/2003  SpO2 94%  Gen: sitting in wheelchair, alert, cooperative and in no acute distress  HEENT: normocephalic; oropharynx clear  Card: RRR, S1, S2, no murmurs  Resp: lungs clear to auscultation bilaterally, no crackles or wheezes  GI: abdomen soft, not-tender  MSK: normal muscle tone, no LE edema  Neuro: CX II-XII grossly in tact; ROM in all four extremities grossly in tact; hypophonia  Psych: alert and oriented to self and general situation; normal affect    LABORATORY/IMAGING DATA:  Reviewed as per Epic    ASSESSMENT/PLAN:    Physical Deconditioning  Mechanical Fall  In setting of hospitalization and underlying medical conditions  -- ongoing PT/OT    Parkinson's Disease  Ambulaltes with a walker at baseline.   -- continues on carbidopa-levodopa  mg - 2 tabs 5x/day and ropinirole 12 mg qevening  -- follow up with neurology is needed (she sees Dr. Schulz at [facility])    Hypothyroidisim  By history. TSH 3.89 on 4/5/17. Not on any  replacement.   -- noted      Electronically signed by:  Sienna Clifton MD

## 2017-04-18 NOTE — PROGRESS NOTES
"[facility]    Chief Complaint   Patient presents with     RECHECK     Fatigue       HPI:    Brenda Clancy is a 67 year old  (1950) female with PMH significant for Parkinson's Disease, spinal stenosis, and hypothyroidism, who is being seen today for an episodic care visit at [facility]. Recently hospitalized at [facility] from 45/ to 4/7 after fall, treated for UTI with cipro.     Today's concerns include:     Urinary tract infection, site unspecified  Completed 3 day course of ciprofloxacin for possible UTI > 100,000 CFU mixed kirsty. Denies dysuira, flank pain, mental status change, fever/chills, hematuria.      Parkinson's disease (H)  Impaired gait and balance. Does not ambulate on her own at baseline, uses walker and wheelchair. Symptoms unchanged. Continues on home regimen of Requip and Sinemet. Followed-up Dr. Schulz for [facility] whom she says she sees biannually.    Dehydration  In hospital Na+ 145, BUN 33, Cr 0.78. On last labs 4/12 sodium normalized to 141, BUN 23 and Cr stable at 0.78. Appetite improved, no dysphagia. Weight stable. No dizzines, syncope, or increased weakness.      Generalized muscle weakness  Fall  In setting of Parkinson's Diease. Says she is feeling overall \"a little stronger\" - continues to work with PT/OT.     Hypothyroidism, unspecified type  Last TSH 3.89 on 4/5/17 off medication.     Spinal Stenosis  Reports chronic low back and buttock pain the morning. Worse with activity. Better with Tylenol and Aleve. Gets better t/o the day. Tylenol started at 6 am two days ago.    ALLERGIES: No known allergies  Past Medical, Surgical, Family and Social History reviewed and updated in EPIC.  Lives in community with .     Current Outpatient Prescriptions   Medication Sig Dispense Refill     ACETAMINOPHEN PO Take 500 mg by mouth daily Two tablets (1000mg) daily at 6:00 am.       Ropinirole HCl (REQUIP XL) 12 MG TB24 " "Take 1 tablet by mouth every evening        fish oil-omega-3 fatty acids 1000 MG capsule Take 1 g by mouth daily       carbidopa-levodopa (SINEMET)  MG per tablet Take 2 tablets by mouth 5 times daily       naproxen sodium (ALEVE) 220 MG capsule Take 220 mg by mouth 2 times daily as needed 60 capsule      calcium carb 1250 mg, 500 mg [place],/vitamin D 200 units (OSCAL WITH D) 500-200 MG-UNIT per tablet Take 1 tablet by mouth 2 times daily (with meals) 90 tablet      Medications reviewed:  Medications reconciled to facility chart and changes were made to reflect current medications as identified as above med list. Below are the changes that were made:   Medications stopped since last EPIC medication reconciliation:   There are no discontinued medications.    Medications started since last [facility] medication reconciliation:  Acetaminophen added for lower back and hip pain on 17.     REVIEW OF SYSTEMS:  Constitutional - No fever/chills. Good appetite improved.  HEENT - No vision change. No itchy or watery eyes. No difficulty hearing.  Pulmonary - No SOB, dyspnea, or cough  CV- No CP, no palpitations.   PV - No leg swelling.   GI - No abd pain. No N/V or diarrhea. Having regular BMs   - see HPI   Neuro - No focal deficit. No dizziness or seizure. Hx of PD.  MS - see HPI  Psychiatric - Mood is \"good for the most part\" - denies memory problems      Physical Exam:  /80  Pulse 66  Temp 97.9  F (36.6  C)  Resp 16  Wt 113 lb 4.8 oz (51.4 kg)  LMP 2003  SpO2 96%  BMI 21.41 kg/m2     BP Readin-134 / 75-89         HR:  66-78    GENERAL APPEARANCE:  Alert, in no distress, seen in bed on waking, wearing hospital gown  EYES:  conjunctiva and lids are normal, EOM intact, pupils 3+ BL round even size  ENT:  moist mucous membranes, hearing acuity grossly normal  NECK:  No cervical LAD  RESP:  Non-labored breathing, no use of accessory muscles, CTA BL w/o rales/wheezes or rhonchi  CV:  Palpation and " auscultation of heart done, rate and rhythm regular, no murmur, no rub or gallop. HR 60. No edema  VASCULAR: 1+ PT BL, calves are supple and non-tender   ABDOMEN:  normal bowel sounds, soft, nontender, non-distended  M/S:   Gait not tested, moves from laying to sitting indpendently, hands w/ slight ulnar deviation no tender or swollen joints strong and even  BL, 4/5 biceps strength, 4-/5 strength w/ knee extension and hip flexion  SKIN: dry, flaking skin to forehead w/o erythema, no increased warmth or tenderness  NEURO: cranial nerves II-XII grossly intact, no facial asymmetry, soft speech, follows commands, normal tone, stiff posture, no tremor, some involuntary movement of head, saw later in day on unit w/o abnormal head movement when up and dressed in WC.  PSYCH:  Flat affect, paucity of speech, oriented to person, place, and time.     Recent Labs:      CBC RESULTS:   Recent Labs   Lab Test 04/12/17 04/05/17   1608   WBC  5.0  7.3   RBC  4.30  4.68   HGB  13.6  14.3   HCT  40.4  43.1   MCV  94  92   MCH  31.6  30.6   MCHC  33.7  33.2   RDW  12.8  12.9   PLT  204  217       Last Basic Metabolic Panel:  Recent Labs   Lab Test 04/12/17 04/07/17   0636   NA  141  144   POTASSIUM  4.0  4.0   CHLORIDE  106  110*   EMMANUEL  9.8  8.7   CO2  25  26   BUN  23*  18   CR  0.78  0.82   GLC  85  97       Liver Function Studies -   Recent Labs   Lab Test  04/05/17   1608  06/17/16   1139   PROTTOTAL  7.3  7.6   ALBUMIN  4.0  3.9   BILITOTAL  0.6  0.4   ALKPHOS  126  106   AST  22  21   ALT  8  13       TSH   Date Value Ref Range Status   04/05/2017 3.89 0.40 - 4.00 mU/L Final   06/17/2016 4.05 (H) 0.40 - 4.00 mU/L Final         Assessment/Plan:  Generalized muscle weakness (pirmary dx)  Fall  Comment: in setting of PD, uses walker at baseline w/ supervision, as well as a wheelchair   Plan: continue PT/OT with goal of returning to home with      Parkinson's disease (H)  Comment: continues w/ Sinemet and Reuip per home  regimen, continue PT/OT  Plan: needs neurology follow-up to scheduled as per hospital d/c orders, order written for staff to schedule with Dr. Schulz (neurologist who follows in community)    Spinal Stenosis  Comment: some low back and hip pain in morning, improved w/ Tylenol and Aleve  Plan: continue scheduled Tylenol and Aleve    Urinary tract infection, site unspecified  Comment: asymptomatic, no leukocytosis   Plan: completed abx, resolved    Dehydration  Comment: labs stable on 4/12, PO intake improved   Plan: no need for interval labs at this time, continue to encourage adequate oral intake     Hypothyroidism, unspecified type  Comment: Last TSH 3.89 on 4/5/17 off medication.   Plan: monitor TSH q 6-12 months     Total time 25 minutes in counseling and coordination of care, including review of records.     Electronically signed by  RAJ Reyes CNP

## 2017-04-19 ENCOUNTER — NURSING HOME VISIT (OUTPATIENT)
Dept: GERIATRICS | Facility: CLINIC | Age: 67
End: 2017-04-19
Payer: MEDICARE

## 2017-04-19 VITALS
DIASTOLIC BLOOD PRESSURE: 80 MMHG | RESPIRATION RATE: 16 BRPM | OXYGEN SATURATION: 96 % | WEIGHT: 113.3 LBS | BODY MASS INDEX: 21.41 KG/M2 | HEART RATE: 66 BPM | TEMPERATURE: 97.9 F | SYSTOLIC BLOOD PRESSURE: 134 MMHG

## 2017-04-19 DIAGNOSIS — E86.0 DEHYDRATION: ICD-10-CM

## 2017-04-19 DIAGNOSIS — E03.9 HYPOTHYROIDISM, UNSPECIFIED TYPE: ICD-10-CM

## 2017-04-19 DIAGNOSIS — G20.A1 PARKINSON'S DISEASE (H): ICD-10-CM

## 2017-04-19 DIAGNOSIS — N39.0 URINARY TRACT INFECTION, SITE UNSPECIFIED: ICD-10-CM

## 2017-04-19 DIAGNOSIS — M48.00 SPINAL STENOSIS, UNSPECIFIED SPINAL REGION: ICD-10-CM

## 2017-04-19 DIAGNOSIS — M62.81 GENERALIZED MUSCLE WEAKNESS: Primary | ICD-10-CM
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
## 2017-04-19 PROCEDURE — 99309 SBSQ NF CARE MODERATE MDM 30: CPT | Performed by: NURSE PRACTITIONER

## 2017-04-19 PROCEDURE — 99207 ZZC CDG-CORRECTLY CODED, REVIEWED AND AGREE: CPT | Performed by: NURSE PRACTITIONER

## 2017-04-20 ENCOUNTER — TRANSFERRED RECORDS (OUTPATIENT)
Dept: HEALTH INFORMATION MANAGEMENT | Facility: CLINIC | Age: 67
End: 2017-04-20

## 2017-04-25 VITALS
RESPIRATION RATE: 16 BRPM | WEIGHT: 114.9 LBS | SYSTOLIC BLOOD PRESSURE: 137 MMHG | BODY MASS INDEX: 21.71 KG/M2 | OXYGEN SATURATION: 96 % | DIASTOLIC BLOOD PRESSURE: 91 MMHG | TEMPERATURE: 98.8 F | HEART RATE: 71 BPM

## 2017-04-25 NOTE — PROGRESS NOTES
"[facility]    Chief Complaint   Patient presents with     RECHECK       HPI:    Brenda Clancy is a 67 year old  (1950), who is being seen today for an episodic care visit at [facility]. Today's concern is:    Fall, sequela  Abnormal CT  Patient fell at TCU on 4/20 and c/o hip pain. Facility x-ray obtained and showed questionable nondisplaced fracture of the greater trochanter of the left femur. On-call provider asked for ED eval and patient was seen at [facility] ED. CT showed no acute femoral fracture. CT states \"a small fracture lucency in the periphery of the left sacrum could be corticated. Correlate clinically\". Also states \"thickened uterine endometrium for the patient's age and left adnexal low-density lesions, correlate with sonography and gynecological evaluation\".     Urinary tract infection, site unspecified  Patient is now off antibiotics and has no urinary symptoms. No fevers.     Parkinson's disease (H)  Generalized muscle weakness  Continues therapies. Uses walker and wheelchair for mobility. Feels she is getting stronger and getting closer to her baseline. She continues her PTA meds and f/u with neurology per routine.     Hypothyroidism, unspecified type  Currently not medicated.   Lab Results   Component Value Date    TSH 3.89 04/05/2017    TSH 4.05 06/17/2016        ALLERGIES: No known allergies  Past Medical, Surgical, Family and Social History reviewed and updated in [facility].    Current Outpatient Prescriptions   Medication Sig Dispense Refill     ACETAMINOPHEN PO Take 500 mg by mouth daily Two tablets (1000mg) daily at 6:00 am.       Ropinirole HCl (REQUIP XL) 12 MG TB24 Take 1 tablet by mouth every evening        fish oil-omega-3 fatty acids 1000 MG capsule Take 1 g by mouth daily       carbidopa-levodopa (SINEMET)  MG per tablet Take 2 tablets by mouth 5 times daily       naproxen sodium (ALEVE) 220 MG capsule Take 220 mg by mouth 2 times daily " as needed 60 capsule      calcium carb 1250 mg, 500 mg [place],/vitamin D 200 units (OSCAL WITH D) 500-200 MG-UNIT per tablet Take 1 tablet by mouth 2 times daily (with meals) 90 tablet      Medications reviewed:  Medications reconciled to facility chart and changes were made to reflect current medications as identified as above med list. Below are the changes that were made:   Medications stopped since last EPIC medication reconciliation:   There are no discontinued medications.    Medications started since last [facility] medication reconciliation:  No orders of the defined types were placed in this encounter.      REVIEW OF SYSTEMS:  10 point ROS of systems including Constitutional, Eyes, Respiratory, Cardiovascular, Gastroenterology, Genitourinary, Integumentary, Musculoskeletal, Psychiatric were all negative except for pertinent positives noted in my HPI.    Physical Exam:  BP (!) 137/91  Pulse 71  Temp 98.8  F (37.1  C)  Resp 16  Wt 114 lb 14.4 oz (52.1 kg)  LMP 07/08/2003  SpO2 96%  BMI 21.71 kg/m2   GENERAL APPEARANCE:  Alert, in no distress, pleasant, cooperative, oriented x 4  EYES:  EOM, lids, pupils and irises normal, sclera clear and conjunctiva normal, no discharge or mattering on lids or lashes noted  ENT:  Mouth normal, moist mucous membranes, nose normal without drainage or crusting, external ears without lesions, hearing acuity intact  RESP:  respiratory effort and palpation of chest normal, no chest wall tenderness, no respiratory distress, Lung sounds clear, patient is on room air  CV:  Palpation and auscultation of heart done, rate and rhythm controlled and regular, no murmur, no rub or gallop. Edema none.  ABDOMEN:  normal bowel sounds, soft, nontender, no grimacing or guarding with palpation, no hepatosplenomegaly or other masses  M/S:   Gait and station unsafe without assistance and up with walker and has shuffling gate, left leg appears weaker than right, Digits and nails normal, no tenderness  or swelling of the joints; able to move all extremities   NEURO: cranial nerves 2-12 grossly intact, no facial asymmetry, no speech deficits and able to follow directions.  PSYCH:  insight and judgement at baseline, memory somewhat forgetful, affect and mood normal     BP Readin-155 /        HR:  50-94 Weights:  113.3lbs - 114.9lbs      Recent Labs:      CBC RESULTS:   Recent Labs   Lab Test 17   1608   WBC  5.0  7.3   RBC  4.30  4.68   HGB  13.6  14.3   HCT  40.4  43.1   MCV  94  92   MCH  31.6  30.6   MCHC  33.7  33.2   RDW  12.8  12.9   PLT  204  217       Last Basic Metabolic Panel:  Recent Labs   Lab Test 17   0636   NA  141  144   POTASSIUM  4.0  4.0   CHLORIDE  106  110*   EMMANUEL  9.8  8.7   CO2  25  26   BUN  23*  18   CR  0.78  0.82   GLC  85  97       Liver Function Studies -   Recent Labs   Lab Test  17   1608  16   1139   PROTTOTAL  7.3  7.6   ALBUMIN  4.0  3.9   BILITOTAL  0.6  0.4   ALKPHOS  126  106   AST  22  21   ALT  8  13       TSH   Date Value Ref Range Status   2017 3.89 0.40 - 4.00 mU/L Final   2016 4.05 (H) 0.40 - 4.00 mU/L Final         Assessment/Plan:   Diagnosis Comments   1. Urinary tract infection, site unspecified  Resolved. Monitor and f/u PRN urinary symptoms   2. Parkinson's disease (H)  Chronic; meds as above and f/u with neurology per routine.    3. Fall, sequela and abnormal CT of pelvis Continues rehab - will ask ortho MARTINA Alvarez to evaluate CT results re: small fracture lucency in the periphery of the left sacrum and also if OK with patient schedule with GYN for further work up of CT findings.   4. Dehydration  Resolved. Encourage adequate intake. F/U renal function PRN.    5. Hypothyroidism, unspecified type  Now off meds. No symptoms. F/U PRN.    6. Generalized muscle weakness  Ongoing issue, improving gradually. F/U with progress next week.        Orders:  1. If OK with patient - schedule for GYN consult due to  CT of pelvis abnormal findings. Let NP know date of consult.     Total time spent with patient visit was 35 min including patient visit, review of past records and discussion of patient status and POC with staff. Greater than 50% of total time spent with counseling and coordinating care due to above health issues.      Electronically signed by  RAJ Khan CNP

## 2017-04-26 ENCOUNTER — TELEPHONE (OUTPATIENT)
Dept: GERIATRICS | Facility: CLINIC | Age: 67
End: 2017-04-26

## 2017-04-26 ENCOUNTER — NURSING HOME VISIT (OUTPATIENT)
Dept: GERIATRICS | Facility: CLINIC | Age: 67
End: 2017-04-26
Payer: MEDICARE

## 2017-04-26 ENCOUNTER — CARE COORDINATION (OUTPATIENT)
Dept: CARE COORDINATION | Facility: CLINIC | Age: 67
End: 2017-04-26

## 2017-04-26 DIAGNOSIS — G20.A1 PARKINSON'S DISEASE (H): ICD-10-CM
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
## 2017-04-26 DIAGNOSIS — M62.81 GENERALIZED MUSCLE WEAKNESS: ICD-10-CM

## 2017-04-26 DIAGNOSIS — N39.0 URINARY TRACT INFECTION, SITE UNSPECIFIED: Primary | ICD-10-CM

## 2017-04-26 DIAGNOSIS — R93.89 ABNORMAL CAT SCAN: ICD-10-CM

## 2017-04-26 DIAGNOSIS — E03.9 HYPOTHYROIDISM, UNSPECIFIED TYPE: ICD-10-CM

## 2017-04-26 DIAGNOSIS — E86.0 DEHYDRATION: ICD-10-CM

## 2017-04-26 PROCEDURE — 99310 SBSQ NF CARE HIGH MDM 45: CPT | Performed by: NURSE PRACTITIONER

## 2017-04-26 PROCEDURE — 99207 ZZC CDG-CORRECTLY CODED, REVIEWED AND AGREE: CPT | Performed by: NURSE PRACTITIONER

## 2017-04-26 NOTE — TELEPHONE ENCOUNTER
Ortho Nursing home visit    Brenda Clancy is a 67 year old female who resides at [facility]    Patient has x-rays reviewed for question of Fx possibly noted on CT Scan on 4/20/2017 at [facility], Ct scan is not available, today.       Past Medical History:   Diagnosis Date     Bunion 4/11/00    RIGHT FIRST TOE     Cyst of Bartholin's gland     COLPOSCOPY PER PT -EARLY '80'S - WAS OKAY     Delirium 10/27/2013     HYPOTHYROIDISM      Paralysis agitans (H) 3/13/01    PARKINSON 'S DZ - DR CRAWFORD,NEUROLOGY     Symptomatic menopausal or female climacteric states 1/29/01      Past Surgical History:   Procedure Laterality Date     C NONSPECIFIC PROCEDURE      S/P EXCISED GANGLION,PALM LEFT HAND     C NONSPECIFIC PROCEDURE  4/13/00    [facility] BUNIONECTOMY RIGHT FIRST TOE        Allergies   Allergen Reactions     No Known Allergies       LMP 07/08/2003     Per NP patient is not having symptoms in regards to any hip or groin pain, uses Walker/ and W/C      X-rays show from 4/19/2017 no evidence of non-displaced fx on plain x-rays of the hip and Pelvis    ASSESSMENT / PLAN:  Would only monitor symptoms, and only repeat x-rays if new onset of pain; for now would let patient resume all previous activity: Discussed with NP today.          J.Ralf OPA-C  [phone]

## 2017-04-26 NOTE — MR AVS SNAPSHOT
"              After Visit Summary   4/26/2017    Brenda Clancy    MRN: [id number]           Patient Information     Date Of Birth          1950        Visit Information        Provider Department      4/26/2017 8:00 AM Diana Pugh APRN CNP Ger[facility] Transitional [facility]        Today's Diagnoses     Urinary tract infection, site unspecified    -  1    Parkinson's disease (H)        Fall, sequela        Abnormal CAT scan        Dehydration        Hypothyroidism, unspecified type        Generalized muscle weakness           Follow-ups after your visit        Who to contact     If you have questions or need follow up information about today's clinic visit or your schedule please contact GER[facility]AT[facility]ITIONAL [facility] directly at [phone].  Normal or non-critical lab and imaging results will be communicated to you by SqueezeCMMhart, letter or phone within 4 business days after the clinic has received the results. If you do not hear from us within 7 days, please contact the clinic through SqueezeCMMhart or phone. If you have a critical or abnormal lab result, we will notify you by phone as soon as possible.  Submit refill requests through [facility] or call your pharmacy and they will forward the refill request to us. Please allow 3 business days for your refill to be completed.          Additional Information About Your Visit        MyChart Information     [facility]e lets you send messages to your doctor, view your test results, renew your prescriptions, schedule appointments and more. To sign up, go to [email or web address] . Click on \"Log in\" on the left side of the screen, which will take you to the Welcome page. Then click on \"Sign up Now\" on the right side of the page.     You will be asked to enter the access code listed below, as well as some personal information. Please follow the directions to create your username and password.     Your access code is: [email or web address]  Expires: 7/6/2017  5:49 PM     Your access " code will  in 90 days. If you need help or a new code, please call your [facility] clinic or [phone].        Care EveryWhere ID     This is your Care EveryWhere ID. This could be used by other organizations to access your [facility] medical records  [id number]        Your Vitals Were     Pulse Temperature Respirations Last Period Pulse Oximetry BMI (Body Mass Index)    71 98.8  F (37.1  C) 16 2003 96% 21.71 kg/m2       Blood Pressure from Last 3 Encounters:   17 (!) 137/91   17 134/80   04/10/17 (!) 155/100    Weight from Last 3 Encounters:   17 114 lb 14.4 oz (52.1 kg)   17 113 lb 4.8 oz (51.4 kg)   17 117 lb (53.1 kg)              Today, you had the following     No orders found for display       Primary Care Provider Office Phone # Fax #    Joseph Alcantara [phone]       [facility] [address]  [facility] [address]        Thank you!     Thank you for choosing [facility]  for your care. Our goal is always to provide you with excellent care. Hearing back from our patients is one way we can continue to improve our services. Please take a few minutes to complete the written survey that you may receive in the mail after your visit with us. Thank you!             Your Updated Medication List - Protect others around you: Learn how to safely use, store and throw away your medicines at www.disposemymeds.org.          This list is accurate as of: 17 12:53 PM.  Always use your most recent med list.                   Brand Name Dispense Instructions for use    ACETAMINOPHEN PO      Take 500 mg by mouth daily Two tablets (1000mg) daily at 6:00 am.       calcium carb 1250 mg (500 mg [place])/vitamin D 200 units 500-200 MG-UNIT per tablet    OSCAL with D    90 tablet    Take 1 tablet by mouth 2 times daily (with meals)       carbidopa-levodopa  MG per tablet    SINEMET     Take 2 tablets by mouth 5 times daily        fish oil-omega-3 fatty acids 1000 MG capsule      Take 1 g by mouth daily       naproxen sodium 220 MG capsule    ALEVE    60 capsule    Take 220 mg by mouth 2 times daily as needed       REQUIP XL 12 MG Tb24   Generic drug:  Ropinirole HCl      Take 1 tablet by mouth every evening

## 2017-04-26 NOTE — PROGRESS NOTES
Clinic Care Coordination Contact  Care Team Conversations    Received call from [employer] SW, Cynthia, indicating patient is currently planning move her daughter's home in [place] upon d/c.  No d/c date has been set as of yet.  TCU RAFAEL will contact writer with update once d/c disposition is known.    Cheryl Welch RN  Clinic Care Coordinator  [facility]  [phone]

## 2017-04-26 NOTE — Clinical Note
"Bob BUTT Could you please review this patient's CT from [facility] on 4/20 - she fell, at TCU x-ray ?nondisplaced left hip fracture. ED CT \"a small fracture lucency in the periphery of the left sacrum could be corticated. Correlate clinically\".  What is that and anything in particular we need to be doing? Thanks so much Diana"

## 2017-05-10 ENCOUNTER — NURSING HOME VISIT (OUTPATIENT)
Dept: GERIATRICS | Facility: CLINIC | Age: 67
End: 2017-05-10
Payer: MEDICARE

## 2017-05-10 VITALS
OXYGEN SATURATION: 94 % | SYSTOLIC BLOOD PRESSURE: 99 MMHG | TEMPERATURE: 98.6 F | BODY MASS INDEX: 21.71 KG/M2 | WEIGHT: 114.9 LBS | HEART RATE: 58 BPM | DIASTOLIC BLOOD PRESSURE: 62 MMHG | RESPIRATION RATE: 16 BRPM

## 2017-05-10 DIAGNOSIS — G89.29 CHRONIC LOW BACK PAIN: Primary | ICD-10-CM

## 2017-05-10 DIAGNOSIS — R93.89 ABNORMAL CT SCAN: ICD-10-CM

## 2017-05-10 DIAGNOSIS — Z91.81 AT RISK FOR FALLS: ICD-10-CM

## 2017-05-10 DIAGNOSIS — M54.50 CHRONIC LOW BACK PAIN: Primary | ICD-10-CM

## 2017-05-10 DIAGNOSIS — R53.81 PHYSICAL DECONDITIONING: ICD-10-CM

## 2017-05-10 PROCEDURE — 99309 SBSQ NF CARE MODERATE MDM 30: CPT | Performed by: NURSE PRACTITIONER

## 2017-05-10 PROCEDURE — 99207 ZZC CDG-UP CODE -MED NECESSITY: CPT | Performed by: NURSE PRACTITIONER

## 2017-05-10 NOTE — PROGRESS NOTES
[facility]    Chief Complaint   Patient presents with     RECHECK       HPI:    Brenda Clancy is a 67 year old  (1950), who is being seen today for an episodic care visit at [facility]. Today's concern is:  Chronic low back pain  Physical deconditioning  At risk for falls  Nursing report patient complaining of back pain and left hip pain. She is in bed and her positioning contributes to her pain due to lack of alignment.   Needs assistance with transfers. Reports she has been calling for assistance.  Abnormal pelvic CT scan   Resident notified of her abnormal pelvic CT from 4/20 which indicated thickening of uterine endometrium. She became sad and asked what would this be indicating. She was offered to follow with GYN.      ALLERGIES: No known allergies  Past Medical, Surgical, Family and Social History reviewed and updated in [facility].    Current Outpatient Prescriptions   Medication Sig Dispense Refill     ACETAMINOPHEN PO Take 1,000 mg by mouth daily        Ropinirole HCl (REQUIP XL) 12 MG TB24 Take 1 tablet by mouth every evening        fish oil-omega-3 fatty acids 1000 MG capsule Take 1 g by mouth daily       carbidopa-levodopa (SINEMET)  MG per tablet Take 2 tablets by mouth 5 times daily       naproxen sodium (ALEVE) 220 MG capsule Take 220 mg by mouth 2 times daily as needed 60 capsule      calcium carb 1250 mg, 500 mg [place],/vitamin D 200 units (OSCAL WITH D) 500-200 MG-UNIT per tablet Take 1 tablet by mouth 2 times daily (with meals) 90 tablet      Medications reviewed:  Medications reconciled to facility chart and changes were made to reflect current medications as identified as above med list. Below are the changes that were made:   Medications stopped since last EPIC medication reconciliation:   There are no discontinued medications.    Medications started since last [facility] medication reconciliation:  No orders of the defined types were placed in this  encounter.    REVIEW OF SYSTEMS:  4 point ROS including Respiratory, CV, GI and , other than that noted in the HPI,  is negative    Physical Exam:  BP 99/62  Pulse 58  Temp 98.6  F (37  C)  Resp 16  Wt 114 lb 14.4 oz (52.1 kg)  LMP 2003  SpO2 94%  BMI 21.71 kg/m2     BP Readin/71, 117/74, 98/61         HR:  55-84  Weights:  113.3    GENERAL APPEARANCE:  Alert, in no distress, cooperative, laying in bed  ENT:  Mouth and posterior oropharynx normal, moist mucous membranes, [place]  EYES:  EOM, conjunctivae, lids, pupils and irises normal, EOM normal, conjunctiva and lids normal  RESP:  respiratory effort and palpation of chest normal, lungs clear to auscultation , no respiratory distress  CV:  Palpation and auscultation of heart done , regular rate and rhythm, no murmur, rub, or gallop, no edema  ABDOMEN:  normal bowel sounds, soft, nontender, no hepatosplenomegaly or other masses, no guarding or rebound  M/S:   Gait and station abnormal unsteady gait  Digits and nails normal  SKIN:  Inspection of skin and subcutaneous tissue baseline, Palpation of skin and subcutaneous tissue baseline  PSYCH:  oriented X 3, normal insight, judgement and memory, affect and mood normal    Recent Labs:      CBC RESULTS:   Recent Labs   Lab Test 17   1608   WBC  5.0  7.3   RBC  4.30  4.68   HGB  13.6  14.3   HCT  40.4  43.1   MCV  94  92   MCH  31.6  30.6   MCHC  33.7  33.2   RDW  12.8  12.9   PLT  204  217       Last Basic Metabolic Panel:  Recent Labs   Lab Test 17   0636   NA  141  144   POTASSIUM  4.0  4.0   CHLORIDE  106  110*   EMMANUEL  9.8  8.7   CO2  25  26   BUN  23*  18   CR  0.78  0.82   GLC  85  97       Liver Function Studies -   Recent Labs   Lab Test  17   1608  16   1139   PROTTOTAL  7.3  7.6   ALBUMIN  4.0  3.9   BILITOTAL  0.6  0.4   ALKPHOS  126  106   AST  22  21   ALT  8  13       TSH   Date Value Ref Range Status   2017 3.89 0.40 - 4.00 mU/L Final    06/17/2016 4.05 (H) 0.40 - 4.00 mU/L Final         Assessment/Plan:  (M54.5,  G89.29) Chronic low back pain  (primary encounter diagnosis)  (R53.81) Physical deconditioning  Z91.81) At risk for falls  Comment: improving  Plan: continue with therapies  -offer warm/ice pack qid to lower back or left hip  -utilize pillows to align her spine when in bed    (R93.8) Abnormal CT scan  Comment: resident does not want to follow with GYN for now.  Plan: may follow as outpatient    Orders:  As noted above.    TimeTotal time spent with patient visit was 25 min including patient visit, review of past records. Greater than 50% of total time spent with counseling and coordinating care due to chronic back and left hip pain.     Electronically signed by  RAJ Eller CNP

## 2017-05-27 ENCOUNTER — HEALTH MAINTENANCE LETTER (OUTPATIENT)
Age: 67
End: 2017-05-27

## 2017-05-30 ENCOUNTER — NURSING HOME VISIT (OUTPATIENT)
Dept: GERIATRICS | Facility: CLINIC | Age: 67
End: 2017-05-30
Payer: MEDICARE

## 2017-05-30 VITALS
DIASTOLIC BLOOD PRESSURE: 64 MMHG | BODY MASS INDEX: 22.5 KG/M2 | WEIGHT: 119.1 LBS | RESPIRATION RATE: 16 BRPM | OXYGEN SATURATION: 96 % | SYSTOLIC BLOOD PRESSURE: 96 MMHG | HEART RATE: 65 BPM | TEMPERATURE: 98.2 F

## 2017-05-30 DIAGNOSIS — R53.81 PHYSICAL DECONDITIONING: ICD-10-CM

## 2017-05-30 DIAGNOSIS — M48.00 SPINAL STENOSIS, UNSPECIFIED SPINAL REGION: ICD-10-CM

## 2017-05-30 DIAGNOSIS — G89.29 CHRONIC BILATERAL LOW BACK PAIN WITHOUT SCIATICA: ICD-10-CM

## 2017-05-30 DIAGNOSIS — M54.50 CHRONIC BILATERAL LOW BACK PAIN WITHOUT SCIATICA: ICD-10-CM
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
## 2017-05-30 DIAGNOSIS — G20.A1 PARKINSON'S DISEASE (H): Primary | ICD-10-CM

## 2017-05-30 PROCEDURE — 99207 ZZC CDG-CORRECTLY CODED, REVIEWED AND AGREE: CPT | Performed by: NURSE PRACTITIONER

## 2017-05-30 PROCEDURE — 99309 SBSQ NF CARE MODERATE MDM 30: CPT | Performed by: NURSE PRACTITIONER

## 2017-05-30 NOTE — PROGRESS NOTES
[facility]    Chief Complaint   Patient presents with     RECHECK       HPI:    Brenda Clancy is a 67 year old  (1950), who is being seen today for an episodic care visit at [facility].  HPI information obtained from: facility chart records, facility staff, patient report and [facility] chart review.Today's concern is:  Parkinson's disease (H)  with  Fall, sequela  2/2 PD.  She is progressing in her disease making it more difficult to live alone.  She is planning on moving to [place] early June.  Flight arrangements have been made.  Denies any increase in pain.  She is on scheduled Tylenol, Naproxen PRN, no narcotic pain medication    Spinal stenosis, unspecified spinal region  with  Chronic bilateral low back pain without sciatica  States the pain in be back has gone on for some time.  She realizes she has a high risk for falls and is accepting of assistance with transfers and mobility, and with set up she can do ADLS.  She has no changes in bowel or dladder      Physical deconditioning  Improving, feels she is getting stronger.        ALLERGIES: No known allergies  Past Medical, Surgical, Family and Social History reviewed and updated in [facility].    Current Outpatient Prescriptions   Medication Sig Dispense Refill     ACETAMINOPHEN PO Take 1,000 mg by mouth daily        Ropinirole HCl (REQUIP XL) 12 MG TB24 Take 1 tablet by mouth every evening        fish oil-omega-3 fatty acids 1000 MG capsule Take 1 g by mouth daily       carbidopa-levodopa (SINEMET)  MG per tablet Take 2 tablets by mouth 5 times daily       naproxen sodium (ALEVE) 220 MG capsule Take 220 mg by mouth 2 times daily as needed 60 capsule      calcium carb 1250 mg, 500 mg [place],/vitamin D 200 units (OSCAL WITH D) 500-200 MG-UNIT per tablet Take 1 tablet by mouth 2 times daily (with meals) 90 tablet      Medications reviewed:  Medications reconciled to facility chart and changes were made to  reflect current medications as identified as above med list. Below are the changes that were made:   Medications stopped since last EPIC medication reconciliation:   There are no discontinued medications.    Medications started since last [facility] medication reconciliation:  No orders of the defined types were placed in this encounter.        REVIEW OF SYSTEMS:  4 point ROS including Respiratory, CV, GI and , other than that noted in the HPI,  is negative    Physical Exam:  BP 96/64  Pulse 65  Temp 98.2  F (36.8  C)  Resp 16  Wt 119 lb 1.6 oz (54 kg)  LMP 2003  SpO2 96%  BMI 22.5 kg/m2     BP Readin/60, 131/75, 104/66        HR:  61-73  Weights:  118.3, 118.1, 114.5  GENERAL APPEARANCE:  Alert, in no distress, pleasant, cooperative, oriented x self, place, month/year  RESP:  respiratory effort and palpation of chest normal, no chest wall tenderness, no respiratory distress, Lung sounds clear, patient is on room air  CV:  Palpation and auscultation of heart done, rate and rhythm controlled and regular, no murmur, no rub or gallop. Edema none bilateral lower extremities.  ABDOMEN:  normal bowel sounds, soft, nontender, no grimacing or guarding with palpation, no hepatosplenomegaly or other masses  M/S:   Gait and station unsafe without assistance and up with walker and has shuffling gate, left leg appears weaker than right, Digits and nails normal, no tenderness or swelling of the joints; able to move all extremities generalized stiffness and weakness  NEURO: cranial nerves 2-12 grossly intact, no facial asymmetry, no speech deficits and able to follow directions, moves all extremities symmetrically  PSYCH:  insight and judgement at baseline, memory somewhat forgetful, affect and mood normal       Recent Labs:      CBC RESULTS:   Recent Labs   Lab Test 17   1608   WBC  5.0  7.3   RBC  4.30  4.68   HGB  13.6  14.3   HCT  40.4  43.1   MCV  94  92   MCH  31.6  30.6   MCHC  33.7  33.2    RDW  12.8  12.9   PLT  204  217       Last Basic Metabolic Panel:  Recent Labs   Lab Test 04/12/17 04/07/17   0636   NA  141  144   POTASSIUM  4.0  4.0   CHLORIDE  106  110*   EMMANUEL  9.8  8.7   CO2  25  26   BUN  23*  18   CR  0.78  0.82   GLC  85  97       Liver Function Studies -   Recent Labs   Lab Test  04/05/17   1608  06/17/16   1139   PROTTOTAL  7.3  7.6   ALBUMIN  4.0  3.9   BILITOTAL  0.6  0.4   ALKPHOS  126  106   AST  22  21   ALT  8  13       TSH   Date Value Ref Range Status   04/05/2017 3.89 0.40 - 4.00 mU/L Final   06/17/2016 4.05 (H) 0.40 - 4.00 mU/L Final         Assessment/Plan:  (G20) Parkinson's disease (H)  (primary encounter diagnosis)  (W19.XXXS) Fall, sequela  Comment: high risk for frequent falls  Plan: continue Parkinson's medication as is.  Followed by neurology.  She will be moving to live with family who can give her more support.    (M48.00) Spinal stenosis, unspecified spinal region  (M54.5,  G89.29) Chronic bilateral low back pain without sciatica  Comment: pain managed with Tylenol and Naproxen   Plan: no changes in pain medication.  Monitor for changes.    (R53.81) Physical deconditioning  Comment: has met goals  Plan: continue to mobilize.  Awaiting discharge to [place] in June        Electronically signed by  RAJ Riggs CNP

## 2017-06-15 ENCOUNTER — DISCHARGE SUMMARY NURSING HOME (OUTPATIENT)
Dept: GERIATRICS | Facility: CLINIC | Age: 67
End: 2017-06-15
Payer: MEDICARE

## 2017-06-15 VITALS
BODY MASS INDEX: 22.11 KG/M2 | WEIGHT: 117 LBS | OXYGEN SATURATION: 98 % | TEMPERATURE: 97.4 F | SYSTOLIC BLOOD PRESSURE: 143 MMHG | DIASTOLIC BLOOD PRESSURE: 93 MMHG | RESPIRATION RATE: 16 BRPM | HEART RATE: 87 BPM

## 2017-06-15 DIAGNOSIS — N39.0 URINARY TRACT INFECTION, SITE UNSPECIFIED: Primary | ICD-10-CM

## 2017-06-15 DIAGNOSIS — R93.89 ABNORMAL CT SCAN: ICD-10-CM
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
## 2017-06-15 DIAGNOSIS — M62.81 GENERALIZED MUSCLE WEAKNESS: ICD-10-CM

## 2017-06-15 DIAGNOSIS — E03.9 HYPOTHYROIDISM, UNSPECIFIED TYPE: ICD-10-CM

## 2017-06-15 DIAGNOSIS — M48.00 SPINAL STENOSIS, UNSPECIFIED SPINAL REGION: ICD-10-CM

## 2017-06-15 DIAGNOSIS — G20.A1 PARKINSON'S DISEASE (H): ICD-10-CM

## 2017-06-15 DIAGNOSIS — E86.0 DEHYDRATION: ICD-10-CM

## 2017-06-15 PROCEDURE — 99316 NF DSCHRG MGMT 30 MIN+: CPT | Performed by: NURSE PRACTITIONER

## 2017-06-15 PROCEDURE — 99207 ZZC CDG-CORRECTLY CODED, REVIEWED AND AGREE: CPT | Performed by: NURSE PRACTITIONER

## 2017-06-15 NOTE — PATIENT INSTRUCTIONS
[facility] Discharge Orders    Name: Brenda Clancy  : 1950  Planned Discharge Date: 6/15/17  Discharged to: with family     MEDICAL FOLLOW UP  Follow up with PCP in 1-2 weeks - please contact [facility] in [place], [place] to establish care with new PCP    Follow up with Specialists: patient is referred to [facility] for consult/establish care due to diagnosis of Parkinson disease      FUTURE LABS: No labs orders/due    ORDER CHANGES:  None    DISCHARGE MEDICATIONS:  The patient s pharmacy is authorized to dispense a 30-day supply of medications. Refill requests should be directed to the primary provider of choice.   No narcotics are prescribed at time of discharge.   Current Outpatient Prescriptions   Medication Sig Dispense Refill     ACETAMINOPHEN PO Take 1,000 mg by mouth daily        Ropinirole HCl (REQUIP XL) 12 MG TB24 Take 1 tablet by mouth every evening        fish oil-omega-3 fatty acids 1000 MG capsule Take 1 g by mouth daily       carbidopa-levodopa (SINEMET)  MG per tablet Take 2 tablets by mouth 5 times daily       naproxen sodium (ALEVE) 220 MG capsule Take 220 mg by mouth 2 times daily as needed 60 capsule      calcium carb 1250 mg, 500 mg [place],/vitamin D 200 units (OSCAL WITH D) 500-200 MG-UNIT per tablet Take 1 tablet by mouth 2 times daily (with meals) 90 tablet        SERVICES:  None needed    ADDITIONAL INSTRUCTIONS:  None    RAJ Knight CNP  This document was electronically signed on Janice 15, 2017

## 2017-06-15 NOTE — PROGRESS NOTES
[facility] DISCHARGE SUMMARY    PATIENT'S NAME: Brenda Clancy  YOB: 1950  MEDICAL RECORD NUMBER:  [id number]    PRIMARY CARE PROVIDER AND CLINIC RESPONSIBLE AFTER TRANSFER: [facility] [address] / [place] [address] --- patient moving to [place] and family/patient aware of need to establish primary care with new provider next week.     CODE STATUS/ADVANCE DIRECTIVES DISCUSSION:   DNR / DNI       Allergies   Allergen Reactions     No Known Allergies        TRANSFERRING PROVIDERS: RAJ Khan CNP, Sienna Clifton MD  DATE OF SNF ADMISSION:  April / 07 / 2017  DATE OF SNF (anticipated) DISCHARGE: Janice / 15 / 2017  DISCHARGE DISPOSITION: Home   Nursing Facility: [facility] stay 4/5/2017 to 4/7/2017.     Condition on Discharge:  Stable.  Function:  Patient is in a wheelchair and transfers with assist  Cognitive Scores: not currently available    Equipment: wheelchair    DISCHARGE DIAGNOSIS:   1. Urinary tract infection, site unspecified    2. Parkinson's disease (H)    3. Fall, sequela    4. Generalized muscle weakness    5. Dehydration    6. Hypothyroidism, unspecified type        HPI Nursing Facility Course:  Brenda Clancy is a 67 year old  (1950),admitted to the [facility] from [facility].  Hospital stay 4/5/2017 through 4/7/2017.  Admitted to this facility for  rehab, medical management and nursing care.  facility chart records, facility staff, patient report and [facility] chart review.    Urinary tract infection, site unspecified  Parkinson's disease (H)  Generalized muscle weakness  Initially hospitalized due to fall at home. Lives with  in own home. Unsteady gait and uses a walker with assist. Does not ambulate on her own. Also has a w/c. Patient is generally  "deconditioned. Multiple falls at home. No injury, negative trauma workup in the ER including negative head CT. EKG showed junctional rhythm HR 57 with possible LVH/cannot rule out anterior infarct. No acute ST-T changes seen on EKG; Chest x-ray showed patchy opacification of the left lung base and could not exclude small left effusion-> CT Chest obtained showed old left posterior rib fractures and specifically clear lungs. Hgb 14.3. No altered mental status. Vital signs remained stable during admission. Had a contaminated urine specimen with > 100, 000 mixed kirsty. Remained afebrile. No leukocytosis. No altered mental status.   --feels stable since discharge to TCU, no longer on therapies. Daughter is taking patient home with her to live in [place] and will need to establish PCP care and neurology f/u with provider of choice.      Dehydration  CMP suggestive of mild dehydration on admission Sodium 145; BUN 33; magnesium 2.4; Creatinine 0.78; likely secondary to poor oral intake and perhaps lack of frequent access to consume adequate fluids (due to mobility limitations). Received IV hydration during admission.   ---Renal function stable on re-check at TCU as noted below.     Fall, sequela  Abnormal CT  Patient fell at TCU on 4/20 and c/o hip pain. Facility x-ray obtained and showed questionable nondisplaced fracture of the greater trochanter of the left femur. On-call provider asked for ED eval and patient was seen at [facility] ED. CT showed no acute femoral fracture. CT states \"a small fracture lucency in the periphery of the left sacrum could be corticated. Correlate clinically\". Also states \"thickened uterine endometrium for the patient's age and left adnexal low-density lesions, correlate with sonography and gynecological evaluation\".   --If OK with patient recommended schedule for GYN consult due to CT of pelvis abnormal findings. Patient has declined GYN follow up while at this TCU.     Urinary tract infection, " site unspecified  Patient is now off antibiotics and has no urinary symptoms. No fevers.     Hypothyroidism, unspecified type  Currently not medicated.   Lab Results   Component Value Date    TSH 3.89 04/05/2017    TSH 4.05 06/17/2016       Spinal stenosis, unspecified spinal region  Chronic bilateral low back pain without sciatica  States the pain in be back has gone on for some time.  She realizes she has a high risk for falls and is accepting of assistance with transfers and mobility, and with set up she can do ADLS.  She has no changes in bowel or bladder.    PAST MEDICAL HISTORY:  has a past medical history of Bunion (4/11/00); Cyst of Bartholin's gland; Delirium (10/27/2013); HYPOTHYROIDISM; Paralysis agitans (H) (3/13/01); and Symptomatic menopausal or female climacteric states (1/29/01).    DISCHARGE MEDICATIONS:  Current Outpatient Prescriptions   Medication Sig Dispense Refill     ACETAMINOPHEN PO Take 1,000 mg by mouth daily        Ropinirole HCl (REQUIP XL) 12 MG TB24 Take 1 tablet by mouth every evening        fish oil-omega-3 fatty acids 1000 MG capsule Take 1 g by mouth daily       carbidopa-levodopa (SINEMET)  MG per tablet Take 2 tablets by mouth 5 times daily       naproxen sodium (ALEVE) 220 MG capsule Take 220 mg by mouth 2 times daily as needed 60 capsule      calcium carb 1250 mg, 500 mg [place],/vitamin D 200 units (OSCAL WITH D) 500-200 MG-UNIT per tablet Take 1 tablet by mouth 2 times daily (with meals) 90 tablet        MEDICATION CHANGES/RATIONALE:   Tylenol 1000 mg PO daily diagnosis pain    Controlled medications sent with patient: not applicable/none     ROS:    10 point ROS of systems including Constitutional, Eyes, Respiratory, Cardiovascular, Gastroenterology, Genitourinary, Integumentary, Musculoskeletal, Psychiatric were all negative except for pertinent positives noted in my HPI.    Physical Exam:   Vitals: BP (!) 143/93  Pulse 87  Temp 97.4  F (36.3  C)  Resp 16  Wt 117 lb  (53.1 kg)  [facility] 2003  SpO2 98%  BMI 22.11 kg/m2  BMI= Body mass index is 22.11 kg/(m^2).   GENERAL APPEARANCE:  Alert, in no distress, pleasant, cooperative, oriented x self, place, family  EYES:  EOM, lids, pupils and irises normal, sclera clear and conjunctiva normal, no discharge or mattering on lids or lashes noted  ENT:  Mouth normal, moist mucous membranes, nose normal without drainage or crusting, external ears without lesions, hearing acuity intact  RESP:  respiratory effort and palpation of chest normal, no chest wall tenderness, no respiratory distress, Lung sounds clear, patient is on room air  CV:  Palpation and auscultation of heart done, rate and rhythm controlled and regular, no murmur, no rub or gallop. Edema trace bilateral lower extremities. VASCULAR: no open areas on feet/lower legs  ABDOMEN:  normal bowel sounds, soft, nontender, no grimacing or guarding with palpation, no hepatosplenomegaly or other masses  M/S:   Gait and station wheelchair bound, walks with assist  and unsafe without assistance, Digits and nails normal, no tenderness or swelling of the joints; able to move all extremities  NEURO: cranial nerves 2-12 grossly intact, no facial asymmetry, no speech deficits and able to follow directions, moves all extremities symmetrically. Resting tremor of head and upper extremities  PSYCH:  insight and judgement intact, memory intact, affect and mood normal     BP Readin-143 / 60-96        HR:  56-96  Weight:   113.3 - 119.2      DISCHARGE PLAN:  discharged to live with family in [place]  Patient instructed to follow-up with:   1) patient to establish care with new primary care provider and have f/u visit next week - provider of choice  2) patient referred to Neurology for consult and to establish care due to Parkinson's disease    [facility] scheduled appointments:  None    MTM referral needed and placed by this provider: No    Pending labs: none    SNF labs     CBC  RESULTS:   Recent Labs   Lab Test 04/12/17 04/05/17   1608   WBC  5.0  7.3   RBC  4.30  4.68   HGB  13.6  14.3   HCT  40.4  43.1   MCV  94  92   MCH  31.6  30.6   MCHC  33.7  33.2   RDW  12.8  12.9   PLT  204  217       Last Basic Metabolic Panel:  Recent Labs   Lab Test 04/12/17 04/07/17   0636   NA  141  144   POTASSIUM  4.0  4.0   CHLORIDE  106  110*   EMMANUEL  9.8  8.7   CO2  25  26   BUN  23*  18   CR  0.78  0.82   GLC  85  97       Liver Function Studies  Recent Labs   Lab Test  04/05/17   1608  06/17/16   1139   PROTTOTAL  7.3  7.6   ALBUMIN  4.0  3.9   BILITOTAL  0.6  0.4   ALKPHOS  126  106   AST  22  21   ALT  8  13       TSH   Date Value Ref Range Status   04/05/2017 3.89 0.40 - 4.00 mU/L Final   06/17/2016 4.05 (H) 0.40 - 4.00 mU/L Final       Discharge Treatments:  None    TOTAL DISCHARGE TIME:   Greater than 30 minutes  Electronically signed by:  RAJ Khan CNP

## 2017-06-16 ENCOUNTER — CARE COORDINATION (OUTPATIENT)
Dept: CARE COORDINATION | Facility: CLINIC | Age: 67
End: 2017-06-16

## 2017-06-16 ENCOUNTER — TELEPHONE (OUTPATIENT)
Dept: FAMILY MEDICINE | Facility: CLINIC | Age: 67
End: 2017-06-16

## 2017-06-16 NOTE — PROGRESS NOTES
Clinic Care Coordination Contact  OUTREACH    Referral Information:  Referral Source: CTS  Reason for Contact: TCU discharge follow up call        Universal Utilization:   ED Visits in last year: 0  Hospital visits in last year: 1  Last PCP appointment: 06/07/16  Missed Appointments: 3  Concerns: none  Multiple Providers or Specialists: Yes (Neurology)    Clinical Concerns:    Current Medical Concerns: Patient was inpatient at [facility] from 4/5/47 to 4/7/17 for UTI. She was transferred to [facility] and was there until discharge on 6/15/17.  Patient has diagnosis of Parkinson's. She needs assist with transfers and all ADL's.     Clinic care coordinator called and spoke with patient's daughter, Octavia. She states they are leaving tomorrow for [place], [place]. Patient will be living with daughter and granddaughter. Octavia states she works full time so she has arranged for a caregiver to be with her mother when she is at work. They are flying by commercial flight.  They will follow up with primary care and neurology in [place].        Current Behavioral Concerns: Denies concerns      Clinical Pathway Name: None      Medication Management:  Daughter will manage     Functional Status:  Mobility Status: Independent w/Device  Equipment Currently Used at Home: walker, rolling, wheelchair  Transportation: Family           Psychosocial:  Current living arrangement:: I live in a private home with spouse    Spouse is staying in [place]. Octavia states they are going to try this arrangement for a year and then evaluate.      Resources and Interventions:  Current Resources:       Advanced Care Plans/Directives on file:: No     Patient/Caregiver understanding: Daughter is understanding     Upcoming appointment:  (n/a)     Plan: Will close to clinic care coordination as patient moves out of state tomorrow.    Jeanna Beltran RN, CCM - Care Coordinator     6/16/2017    1:53 PM  [phone]

## 2023-01-06 NOTE — PLAN OF CARE
Problem: Discharge Planning  Goal: Discharge Planning (Adult, OB, Behavioral, Peds)  Outpatient/Observation goals to be met before discharge home:     -Taking adequate food and fluids? Patiuent tolerated breakfast well. Patient has orders for iv fluids  - safe disposition is determined?. No  -Discharge planning is complete? No  -PT and OT evaluation complete? No. To be done in am.  Patient alert. Denies pain. Dx: fall and parkinsons. Received sinemet. On bedrest. VS wnl.            Statement Selected